# Patient Record
Sex: FEMALE | Race: OTHER | NOT HISPANIC OR LATINO | URBAN - METROPOLITAN AREA
[De-identification: names, ages, dates, MRNs, and addresses within clinical notes are randomized per-mention and may not be internally consistent; named-entity substitution may affect disease eponyms.]

---

## 2021-05-19 ENCOUNTER — EMERGENCY (EMERGENCY)
Facility: HOSPITAL | Age: 81
LOS: 1 days | Discharge: HOME | End: 2021-05-19
Attending: EMERGENCY MEDICINE
Payer: SELF-PAY

## 2021-05-19 VITALS
HEART RATE: 77 BPM | SYSTOLIC BLOOD PRESSURE: 175 MMHG | DIASTOLIC BLOOD PRESSURE: 84 MMHG | HEIGHT: 60 IN | TEMPERATURE: 98 F | RESPIRATION RATE: 16 BRPM | OXYGEN SATURATION: 98 %

## 2021-05-19 VITALS
DIASTOLIC BLOOD PRESSURE: 78 MMHG | TEMPERATURE: 98 F | RESPIRATION RATE: 16 BRPM | HEART RATE: 62 BPM | OXYGEN SATURATION: 98 % | SYSTOLIC BLOOD PRESSURE: 159 MMHG

## 2021-05-19 DIAGNOSIS — Z98.89 OTHER SPECIFIED POSTPROCEDURAL STATES: Chronic | ICD-10-CM

## 2021-05-19 PROCEDURE — 71250 CT THORAX DX C-: CPT | Mod: 26,ME

## 2021-05-19 PROCEDURE — 99284 EMERGENCY DEPT VISIT MOD MDM: CPT | Mod: 25

## 2021-05-19 PROCEDURE — 71046 X-RAY EXAM CHEST 2 VIEWS: CPT | Mod: 26

## 2021-05-19 PROCEDURE — G1004: CPT

## 2021-05-19 PROCEDURE — 93005 ELECTROCARDIOGRAM TRACING: CPT

## 2021-05-19 PROCEDURE — 99284 EMERGENCY DEPT VISIT MOD MDM: CPT

## 2021-05-19 PROCEDURE — 71046 X-RAY EXAM CHEST 2 VIEWS: CPT

## 2021-05-19 PROCEDURE — 71250 CT THORAX DX C-: CPT
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
## 2021-05-19 NOTE — ED PROVIDER NOTE - PATIENT PORTAL LINK FT
You can access the FollowMyHealth Patient Portal offered by [facility] by registering at the following website: [email or web address]. By joining [employer]’s FollowMyHealth portal, you will also be able to view your health information using other applications (apps) compatible with our system.
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
## 2021-05-19 NOTE — ED ADULT NURSE NOTE - NSIMPLEMENTINTERV_GEN_ALL_ED
Implemented All Universal Safety Interventions:  [place] to call system. Call bell, personal items and telephone within reach. Instruct patient to call for assistance. Room bathroom lighting operational. Non-slip footwear when patient is off stretcher. Physically safe environment: no spills, clutter or unnecessary equipment. Stretcher in lowest position, wheels locked, appropriate side rails in place.

## 2021-05-19 NOTE — ED PROVIDER NOTE - CHPI ED SYMPTOMS NEG
no back pain/no disorientation/no dizziness/no headache/no laceration/no neck tenderness/no decreased eating/drinking/no difficulty bearing weight

## 2021-05-19 NOTE — ED PROVIDER NOTE - NSFOLLOWUPINSTRUCTIONS_ED_ALL_ED_FT
Contusion in Adults    WHAT YOU NEED TO KNOW:    A contusion is a bruise that appears on your skin after an injury. A bruise happens when small blood vessels tear but skin does not. Blood leaks into nearby tissue, such as soft tissue or muscle.    DISCHARGE INSTRUCTIONS:    Return to the emergency department if:   •You have new trouble moving the injured area.      •You have tingling or numbness in or near the injured area.      •Your hand or foot below the bruise gets cold or turns pale.       Call your doctor if:   •You find a new lump in the injured area.      •Your symptoms do not improve with treatment after 4 to 5 days.      •You have questions or concerns about your condition or care.      Medicines: You may need any of the following:   •NSAIDs help decrease swelling and pain or fever. This medicine is available with or without a doctor's order. NSAIDs can cause stomach bleeding or kidney problems in certain people. If you take blood thinner medicine, always ask your healthcare provider if NSAIDs are safe for you. Always read the medicine label and follow directions.      •Prescription pain medicine may be given. Ask your healthcare provider how to take this medicine safely. Some prescription pain medicines contain acetaminophen. Do not take other medicines that contain acetaminophen without talking to your healthcare provider. Too much acetaminophen may cause liver damage. Prescription pain medicine may cause constipation. Ask your healthcare provider how to prevent or treat constipation.       •Take your medicine as directed. Contact your healthcare provider if you think your medicine is not helping or if you have side effects. Tell him of her if you are allergic to any medicine. Keep a list of the medicines, vitamins, and herbs you take. Include the amounts, and when and why you take them. Bring the list or the pill bottles to follow-up visits. Carry your medicine list with you in case of an emergency.      Help a contusion heal:   •Rest the injured area or use it less than usual. If you bruised your leg or foot, you may need crutches or a cane to help you walk. This will help you keep weight off your injured body part.       •Apply ice to decrease swelling and pain. Ice may also help prevent tissue damage. Use an ice pack, or put crushed ice in a plastic bag. Cover it with a towel and place it on your bruise for 15 to 20 minutes every hour or as directed.      •Use compression to support the area and decrease swelling. Wrap an elastic bandage around the area over the bruised muscle. Make sure the bandage is not too tight. You should be able to fit 1 finger between the bandage and your skin.      •Elevate (raise) your injured body part above the level of your heart to help decrease pain and swelling. Use pillows, blankets, or rolled towels to elevate the area as often as you can.      •Do not drink alcohol as directed. Alcohol may slow healing.      •Do not stretch injured muscles right after your injury. Ask your healthcare provider when and how you may safely stretch after your injury. Gentle stretches can help increase your flexibility.      •Do not massage the area or put heating pads on the bruise right after your injury. Heat and massage may slow healing. Your healthcare provider may tell you to apply heat after several days. At that time, heat will start to help the injury heal.      Prevent another contusion:   •Stretch and warm up before you play sports or exercise.      •Wear protective gear when you play sports. Examples are shin guards and padding.       •If you begin a new physical activity, start slowly to give your body a chance to adjust.      Follow up with your doctor as directed: Write down your questions so you remember to ask them during your visits
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
## 2025-06-20 ENCOUNTER — INPATIENT (INPATIENT)
Facility: HOSPITAL | Age: 85
LOS: 3 days | Discharge: HOME | DRG: 690 | End: 2025-06-24
Attending: INTERNAL MEDICINE | Admitting: INTERNAL MEDICINE
Payer: COMMERCIAL

## 2025-06-20 VITALS
SYSTOLIC BLOOD PRESSURE: 193 MMHG | TEMPERATURE: 98 F | WEIGHT: 119.71 LBS | HEART RATE: 100 BPM | DIASTOLIC BLOOD PRESSURE: 96 MMHG | RESPIRATION RATE: 16 BRPM | HEIGHT: 60 IN | OXYGEN SATURATION: 98 %

## 2025-06-20 DIAGNOSIS — D64.9 ANEMIA, UNSPECIFIED: ICD-10-CM

## 2025-06-20 DIAGNOSIS — Z98.89 OTHER SPECIFIED POSTPROCEDURAL STATES: Chronic | ICD-10-CM

## 2025-06-20 DIAGNOSIS — N12 TUBULO-INTERSTITIAL NEPHRITIS, NOT SPECIFIED AS ACUTE OR CHRONIC: ICD-10-CM

## 2025-06-20 DIAGNOSIS — E03.9 HYPOTHYROIDISM, UNSPECIFIED: ICD-10-CM

## 2025-06-20 DIAGNOSIS — E78.5 HYPERLIPIDEMIA, UNSPECIFIED: ICD-10-CM

## 2025-06-20 DIAGNOSIS — R11.2 NAUSEA WITH VOMITING, UNSPECIFIED: ICD-10-CM

## 2025-06-20 DIAGNOSIS — N83.8 OTHER NONINFLAMMATORY DISORDERS OF OVARY, FALLOPIAN TUBE AND BROAD LIGAMENT: ICD-10-CM

## 2025-06-20 DIAGNOSIS — I10 ESSENTIAL (PRIMARY) HYPERTENSION: ICD-10-CM

## 2025-06-20 DIAGNOSIS — Z29.9 ENCOUNTER FOR PROPHYLACTIC MEASURES, UNSPECIFIED: ICD-10-CM

## 2025-06-20 DIAGNOSIS — E11.9 TYPE 2 DIABETES MELLITUS WITHOUT COMPLICATIONS: ICD-10-CM

## 2025-06-20 LAB
ACETONE SERPL-MCNC: NEGATIVE — SIGNIFICANT CHANGE UP
ALBUMIN SERPL ELPH-MCNC: 3.6 G/DL — SIGNIFICANT CHANGE UP (ref 3.5–5)
ALP SERPL-CCNC: 75 U/L — SIGNIFICANT CHANGE UP (ref 40–120)
ALT FLD-CCNC: 14 U/L DA — SIGNIFICANT CHANGE UP (ref 10–60)
ANION GAP SERPL CALC-SCNC: 7 MMOL/L — SIGNIFICANT CHANGE UP (ref 5–17)
ANION GAP SERPL CALC-SCNC: 7 MMOL/L — SIGNIFICANT CHANGE UP (ref 5–17)
ANISOCYTOSIS BLD QL: SLIGHT — SIGNIFICANT CHANGE UP
APPEARANCE UR: CLEAR — SIGNIFICANT CHANGE UP
AST SERPL-CCNC: 13 U/L — SIGNIFICANT CHANGE UP (ref 10–40)
B-OH-BUTYR SERPL-SCNC: 0.1 MMOL/L — SIGNIFICANT CHANGE UP
BACTERIA # UR AUTO: NEGATIVE /HPF — SIGNIFICANT CHANGE UP
BASE EXCESS BLDV CALC-SCNC: -5.4 MMOL/L — SIGNIFICANT CHANGE UP
BASOPHILS # BLD AUTO: 0 K/UL — SIGNIFICANT CHANGE UP (ref 0–0.2)
BASOPHILS NFR BLD AUTO: 0 % — SIGNIFICANT CHANGE UP (ref 0–2)
BILIRUB SERPL-MCNC: 0.3 MG/DL — SIGNIFICANT CHANGE UP (ref 0.2–1.2)
BILIRUB UR-MCNC: NEGATIVE — SIGNIFICANT CHANGE UP
BLD GP AB SCN SERPL QL: SIGNIFICANT CHANGE UP
BUN SERPL-MCNC: 11 MG/DL — SIGNIFICANT CHANGE UP (ref 7–18)
BUN SERPL-MCNC: 15 MG/DL — SIGNIFICANT CHANGE UP (ref 7–18)
BURR CELLS BLD QL SMEAR: PRESENT — SIGNIFICANT CHANGE UP
CA-I SERPL-SCNC: SIGNIFICANT CHANGE UP MMOL/L (ref 1.15–1.33)
CALCIUM SERPL-MCNC: 8.6 MG/DL — SIGNIFICANT CHANGE UP (ref 8.4–10.5)
CALCIUM SERPL-MCNC: 9.6 MG/DL — SIGNIFICANT CHANGE UP (ref 8.4–10.5)
CHLORIDE SERPL-SCNC: 104 MMOL/L — SIGNIFICANT CHANGE UP (ref 96–108)
CHLORIDE SERPL-SCNC: 105 MMOL/L — SIGNIFICANT CHANGE UP (ref 96–108)
CO2 SERPL-SCNC: 21 MMOL/L — LOW (ref 22–31)
CO2 SERPL-SCNC: 22 MMOL/L — SIGNIFICANT CHANGE UP (ref 22–31)
COLOR SPEC: YELLOW — SIGNIFICANT CHANGE UP
CREAT SERPL-MCNC: 0.86 MG/DL — SIGNIFICANT CHANGE UP (ref 0.5–1.3)
CREAT SERPL-MCNC: 1.03 MG/DL — SIGNIFICANT CHANGE UP (ref 0.5–1.3)
DIFF PNL FLD: ABNORMAL
EGFR: 54 ML/MIN/1.73M2 — LOW
EGFR: 54 ML/MIN/1.73M2 — LOW
EGFR: 67 ML/MIN/1.73M2 — SIGNIFICANT CHANGE UP
EGFR: 67 ML/MIN/1.73M2 — SIGNIFICANT CHANGE UP
EOSINOPHIL # BLD AUTO: 0 K/UL — SIGNIFICANT CHANGE UP (ref 0–0.5)
EOSINOPHIL NFR BLD AUTO: 0 % — SIGNIFICANT CHANGE UP (ref 0–6)
EPI CELLS # UR: PRESENT
FERRITIN SERPL-MCNC: 49 NG/ML — SIGNIFICANT CHANGE UP (ref 13–330)
GAS PNL BLDV: 134 MMOL/L — LOW (ref 136–145)
GAS PNL BLDV: SIGNIFICANT CHANGE UP
GLUCOSE BLDC GLUCOMTR-MCNC: 127 MG/DL — HIGH (ref 70–99)
GLUCOSE BLDC GLUCOMTR-MCNC: 192 MG/DL — HIGH (ref 70–99)
GLUCOSE BLDV-MCNC: 239 MG/DL — HIGH (ref 70–99)
GLUCOSE SERPL-MCNC: 155 MG/DL — HIGH (ref 70–99)
GLUCOSE SERPL-MCNC: 206 MG/DL — HIGH (ref 70–99)
GLUCOSE UR QL: 100 MG/DL
HCO3 BLDV-SCNC: 20 MMOL/L — LOW (ref 22–29)
HCT VFR BLD CALC: 19.5 % — CRITICAL LOW (ref 34.5–45)
HCT VFR BLD CALC: 35.1 % — SIGNIFICANT CHANGE UP (ref 34.5–45)
HGB BLD-MCNC: 12.2 G/DL — SIGNIFICANT CHANGE UP (ref 11.5–15.5)
HGB BLD-MCNC: 6.6 G/DL — CRITICAL LOW (ref 11.5–15.5)
HYALINE CASTS # UR AUTO: PRESENT
IRON SATN MFR SERPL: 18 UG/DL — LOW (ref 40–150)
IRON SATN MFR SERPL: 6 % — LOW (ref 15–50)
KETONES UR QL: NEGATIVE MG/DL — SIGNIFICANT CHANGE UP
LACTATE BLDV-MCNC: 2.5 MMOL/L — HIGH (ref 0.5–2)
LACTATE SERPL-SCNC: 2 MMOL/L — SIGNIFICANT CHANGE UP (ref 0.7–2)
LACTATE SERPL-SCNC: 2.6 MMOL/L — HIGH (ref 0.7–2)
LEUKOCYTE ESTERASE UR-ACNC: ABNORMAL
LIDOCAIN IGE QN: 56 U/L — SIGNIFICANT CHANGE UP (ref 13–75)
LYMPHOCYTES # BLD AUTO: 0.79 K/UL — LOW (ref 1–3.3)
LYMPHOCYTES # BLD AUTO: 7 % — LOW (ref 13–44)
MAGNESIUM SERPL-MCNC: 1 MG/DL — CRITICAL LOW (ref 1.6–2.6)
MANUAL SMEAR VERIFICATION: SIGNIFICANT CHANGE UP
MCHC RBC-ENTMCNC: 29.3 PG — SIGNIFICANT CHANGE UP (ref 27–34)
MCHC RBC-ENTMCNC: 30.3 PG — SIGNIFICANT CHANGE UP (ref 27–34)
MCHC RBC-ENTMCNC: 33.8 G/DL — SIGNIFICANT CHANGE UP (ref 32–36)
MCHC RBC-ENTMCNC: 34.8 G/DL — SIGNIFICANT CHANGE UP (ref 32–36)
MCV RBC AUTO: 84.4 FL — SIGNIFICANT CHANGE UP (ref 80–100)
MCV RBC AUTO: 89.4 FL — SIGNIFICANT CHANGE UP (ref 80–100)
MONOCYTES # BLD AUTO: 0.11 K/UL — SIGNIFICANT CHANGE UP (ref 0–0.9)
MONOCYTES NFR BLD AUTO: 1 % — LOW (ref 2–14)
NEUTROPHILS # BLD AUTO: 10.41 K/UL — HIGH (ref 1.8–7.4)
NEUTROPHILS NFR BLD AUTO: 92 % — HIGH (ref 43–77)
NITRITE UR-MCNC: NEGATIVE — SIGNIFICANT CHANGE UP
NRBC # BLD: 0 /100 WBCS — SIGNIFICANT CHANGE UP (ref 0–0)
NRBC BLD AUTO-RTO: 0 /100 WBCS — SIGNIFICANT CHANGE UP (ref 0–0)
NRBC BLD-RTO: 0 /100 WBCS — SIGNIFICANT CHANGE UP (ref 0–0)
PCO2 BLDV: 38 MMHG — LOW (ref 39–42)
PH BLDV: 7.33 — SIGNIFICANT CHANGE UP (ref 7.32–7.43)
PH UR: 5.5 — SIGNIFICANT CHANGE UP (ref 5–8)
PHOSPHATE SERPL-MCNC: 2.8 MG/DL — SIGNIFICANT CHANGE UP (ref 2.5–4.5)
PLAT MORPH BLD: NORMAL — SIGNIFICANT CHANGE UP
PLATELET # BLD AUTO: 270 K/UL — SIGNIFICANT CHANGE UP (ref 150–400)
PLATELET # BLD AUTO: 471 K/UL — HIGH (ref 150–400)
PO2 BLDV: 21 MMHG — SIGNIFICANT CHANGE UP
POIKILOCYTOSIS BLD QL AUTO: SLIGHT — SIGNIFICANT CHANGE UP
POTASSIUM BLDV-SCNC: 4.2 MMOL/L — SIGNIFICANT CHANGE UP (ref 3.5–5.1)
POTASSIUM SERPL-MCNC: 3.9 MMOL/L — SIGNIFICANT CHANGE UP (ref 3.5–5.3)
POTASSIUM SERPL-MCNC: 3.9 MMOL/L — SIGNIFICANT CHANGE UP (ref 3.5–5.3)
POTASSIUM SERPL-SCNC: 3.9 MMOL/L — SIGNIFICANT CHANGE UP (ref 3.5–5.3)
POTASSIUM SERPL-SCNC: 3.9 MMOL/L — SIGNIFICANT CHANGE UP (ref 3.5–5.3)
PROT SERPL-MCNC: 7.9 G/DL — SIGNIFICANT CHANGE UP (ref 6–8.3)
PROT UR-MCNC: 100 MG/DL
RBC # BLD: 2.18 M/UL — LOW (ref 3.8–5.2)
RBC # BLD: 4.16 M/UL — SIGNIFICANT CHANGE UP (ref 3.8–5.2)
RBC # FLD: 14.4 % — SIGNIFICANT CHANGE UP (ref 10.3–14.5)
RBC # FLD: 14.8 % — HIGH (ref 10.3–14.5)
RBC BLD AUTO: ABNORMAL
RBC CASTS # UR COMP ASSIST: 0 /HPF — SIGNIFICANT CHANGE UP (ref 0–4)
SAO2 % BLDV: 30.5 % — SIGNIFICANT CHANGE UP
SODIUM SERPL-SCNC: 132 MMOL/L — LOW (ref 135–145)
SODIUM SERPL-SCNC: 134 MMOL/L — LOW (ref 135–145)
SP GR SPEC: 1.01 — SIGNIFICANT CHANGE UP (ref 1–1.03)
TIBC SERPL-MCNC: 319 UG/DL — SIGNIFICANT CHANGE UP (ref 250–450)
TRANSFERRIN SERPL-MCNC: 267 MG/DL — SIGNIFICANT CHANGE UP (ref 200–360)
TROPONIN I, HIGH SENSITIVITY RESULT: 1648.7 NG/L — HIGH
TROPONIN I, HIGH SENSITIVITY RESULT: 202.8 NG/L — HIGH
UIBC SERPL-MCNC: 301 UG/DL — SIGNIFICANT CHANGE UP (ref 110–370)
UROBILINOGEN FLD QL: 0.2 MG/DL — SIGNIFICANT CHANGE UP (ref 0.2–1)
WBC # BLD: 11.32 K/UL — HIGH (ref 3.8–10.5)
WBC # BLD: 21.52 K/UL — HIGH (ref 3.8–10.5)
WBC # FLD AUTO: 11.32 K/UL — HIGH (ref 3.8–10.5)
WBC # FLD AUTO: 21.52 K/UL — HIGH (ref 3.8–10.5)
WBC UR QL: 5 /HPF — SIGNIFICANT CHANGE UP (ref 0–5)

## 2025-06-20 PROCEDURE — 99291 CRITICAL CARE FIRST HOUR: CPT

## 2025-06-20 PROCEDURE — 71260 CT THORAX DX C+: CPT | Mod: 26

## 2025-06-20 PROCEDURE — 74174 CTA ABD&PLVS W/CONTRAST: CPT | Mod: 26

## 2025-06-20 RX ORDER — CEFTRIAXONE 500 MG/1
2000 INJECTION, POWDER, FOR SOLUTION INTRAMUSCULAR; INTRAVENOUS EVERY 24 HOURS
Refills: 0 | Status: DISCONTINUED | OUTPATIENT
Start: 2025-06-21 | End: 2025-06-24

## 2025-06-20 RX ORDER — OXYCODONE HYDROCHLORIDE 30 MG/1
5 TABLET ORAL EVERY 4 HOURS
Refills: 0 | Status: DISCONTINUED | OUTPATIENT
Start: 2025-06-20 | End: 2025-06-24

## 2025-06-20 RX ORDER — ALENDRONATE SODIUM 70 MG/1
1 TABLET ORAL
Refills: 0 | DISCHARGE

## 2025-06-20 RX ORDER — INSULIN LISPRO 100 U/ML
INJECTION, SOLUTION INTRAVENOUS; SUBCUTANEOUS
Refills: 0 | Status: DISCONTINUED | OUTPATIENT
Start: 2025-06-20 | End: 2025-06-24

## 2025-06-20 RX ORDER — AMLODIPINE BESYLATE 10 MG/1
5 TABLET ORAL DAILY
Refills: 0 | Status: DISCONTINUED | OUTPATIENT
Start: 2025-06-20 | End: 2025-06-24

## 2025-06-20 RX ORDER — SODIUM CHLORIDE 9 G/1000ML
1000 INJECTION, SOLUTION INTRAVENOUS
Refills: 0 | Status: DISCONTINUED | OUTPATIENT
Start: 2025-06-20 | End: 2025-06-21

## 2025-06-20 RX ORDER — BISACODYL 5 MG
5 TABLET, DELAYED RELEASE (ENTERIC COATED) ORAL DAILY
Refills: 0 | Status: DISCONTINUED | OUTPATIENT
Start: 2025-06-20 | End: 2025-06-24

## 2025-06-20 RX ORDER — NALOXONE HYDROCHLORIDE 0.4 MG/ML
0.4 INJECTION, SOLUTION INTRAMUSCULAR; INTRAVENOUS; SUBCUTANEOUS ONCE
Refills: 0 | Status: DISCONTINUED | OUTPATIENT
Start: 2025-06-20 | End: 2025-06-24

## 2025-06-20 RX ORDER — AMLODIPINE BESYLATE 10 MG/1
1 TABLET ORAL
Refills: 0 | DISCHARGE

## 2025-06-20 RX ORDER — MAGNESIUM, ALUMINUM HYDROXIDE 200-200 MG
30 TABLET,CHEWABLE ORAL EVERY 4 HOURS
Refills: 0 | Status: DISCONTINUED | OUTPATIENT
Start: 2025-06-20 | End: 2025-06-24

## 2025-06-20 RX ORDER — OXYCODONE HYDROCHLORIDE 30 MG/1
2.5 TABLET ORAL EVERY 4 HOURS
Refills: 0 | Status: DISCONTINUED | OUTPATIENT
Start: 2025-06-20 | End: 2025-06-24

## 2025-06-20 RX ORDER — LOSARTAN POTASSIUM 100 MG/1
100 TABLET, FILM COATED ORAL DAILY
Refills: 0 | Status: DISCONTINUED | OUTPATIENT
Start: 2025-06-20 | End: 2025-06-24

## 2025-06-20 RX ORDER — LEVOTHYROXINE SODIUM 300 MCG
1 TABLET ORAL
Refills: 0 | DISCHARGE

## 2025-06-20 RX ORDER — SENNA 187 MG
2 TABLET ORAL AT BEDTIME
Refills: 0 | Status: DISCONTINUED | OUTPATIENT
Start: 2025-06-20 | End: 2025-06-24

## 2025-06-20 RX ORDER — ONDANSETRON HCL/PF 4 MG/2 ML
4 VIAL (ML) INJECTION ONCE
Refills: 0 | Status: COMPLETED | OUTPATIENT
Start: 2025-06-20 | End: 2025-06-20

## 2025-06-20 RX ORDER — SITAGLIPTIN AND METFORMIN HYDROCHLORIDE 1000; 50 MG/1; MG/1
1 TABLET, FILM COATED ORAL
Refills: 0 | DISCHARGE

## 2025-06-20 RX ORDER — CEFTRIAXONE 500 MG/1
1000 INJECTION, POWDER, FOR SOLUTION INTRAMUSCULAR; INTRAVENOUS ONCE
Refills: 0 | Status: COMPLETED | OUTPATIENT
Start: 2025-06-20 | End: 2025-06-20

## 2025-06-20 RX ORDER — POLYETHYLENE GLYCOL 3350 17 G/17G
17 POWDER, FOR SOLUTION ORAL DAILY
Refills: 0 | Status: DISCONTINUED | OUTPATIENT
Start: 2025-06-20 | End: 2025-06-24

## 2025-06-20 RX ORDER — MAGNESIUM SULFATE 500 MG/ML
1 SYRINGE (ML) INJECTION ONCE
Refills: 0 | Status: COMPLETED | OUTPATIENT
Start: 2025-06-20 | End: 2025-06-20

## 2025-06-20 RX ORDER — GEMFIBROZIL 600 MG/1
1 TABLET, FILM COATED ORAL
Refills: 0 | DISCHARGE

## 2025-06-20 RX ORDER — INSULIN LISPRO 100 U/ML
INJECTION, SOLUTION INTRAVENOUS; SUBCUTANEOUS AT BEDTIME
Refills: 0 | Status: DISCONTINUED | OUTPATIENT
Start: 2025-06-20 | End: 2025-06-24

## 2025-06-20 RX ORDER — LIDOCAINE HYDROCHLORIDE 20 MG/ML
1 JELLY TOPICAL DAILY
Refills: 0 | Status: DISCONTINUED | OUTPATIENT
Start: 2025-06-20 | End: 2025-06-24

## 2025-06-20 RX ORDER — MELATONIN 5 MG
3 TABLET ORAL AT BEDTIME
Refills: 0 | Status: DISCONTINUED | OUTPATIENT
Start: 2025-06-20 | End: 2025-06-24

## 2025-06-20 RX ORDER — ONDANSETRON HCL/PF 4 MG/2 ML
4 VIAL (ML) INJECTION EVERY 8 HOURS
Refills: 0 | Status: DISCONTINUED | OUTPATIENT
Start: 2025-06-20 | End: 2025-06-24

## 2025-06-20 RX ORDER — LOSARTAN POTASSIUM 100 MG/1
1 TABLET, FILM COATED ORAL
Refills: 0 | DISCHARGE

## 2025-06-20 RX ORDER — SODIUM CHLORIDE 9 G/1000ML
1000 INJECTION, SOLUTION INTRAVENOUS ONCE
Refills: 0 | Status: COMPLETED | OUTPATIENT
Start: 2025-06-20 | End: 2025-06-20

## 2025-06-20 RX ORDER — LEVOTHYROXINE SODIUM 300 MCG
25 TABLET ORAL DAILY
Refills: 0 | Status: DISCONTINUED | OUTPATIENT
Start: 2025-06-20 | End: 2025-06-24

## 2025-06-20 RX ADMIN — Medication 2 TABLET(S): at 21:42

## 2025-06-20 RX ADMIN — Medication 4 MILLIGRAM(S): at 09:51

## 2025-06-20 RX ADMIN — OXYCODONE HYDROCHLORIDE 5 MILLIGRAM(S): 30 TABLET ORAL at 19:29

## 2025-06-20 RX ADMIN — Medication 100 GRAM(S): at 22:55

## 2025-06-20 RX ADMIN — LIDOCAINE HYDROCHLORIDE 1 PATCH: 20 JELLY TOPICAL at 20:54

## 2025-06-20 RX ADMIN — OXYCODONE HYDROCHLORIDE 2.5 MILLIGRAM(S): 30 TABLET ORAL at 14:55

## 2025-06-20 RX ADMIN — OXYCODONE HYDROCHLORIDE 5 MILLIGRAM(S): 30 TABLET ORAL at 18:45

## 2025-06-20 RX ADMIN — SODIUM CHLORIDE 75 MILLILITER(S): 9 INJECTION, SOLUTION INTRAVENOUS at 22:53

## 2025-06-20 RX ADMIN — Medication 20 MILLIGRAM(S): at 09:17

## 2025-06-20 RX ADMIN — AMLODIPINE BESYLATE 5 MILLIGRAM(S): 10 TABLET ORAL at 15:25

## 2025-06-20 RX ADMIN — OXYCODONE HYDROCHLORIDE 2.5 MILLIGRAM(S): 30 TABLET ORAL at 15:25

## 2025-06-20 RX ADMIN — SODIUM CHLORIDE 1000 MILLILITER(S): 9 INJECTION, SOLUTION INTRAVENOUS at 14:57

## 2025-06-20 RX ADMIN — CEFTRIAXONE 100 MILLIGRAM(S): 500 INJECTION, POWDER, FOR SOLUTION INTRAMUSCULAR; INTRAVENOUS at 13:40

## 2025-06-20 RX ADMIN — Medication 1000 MILLILITER(S): at 09:17

## 2025-06-20 RX ADMIN — LIDOCAINE HYDROCHLORIDE 1 PATCH: 20 JELLY TOPICAL at 18:45

## 2025-06-20 RX ADMIN — SODIUM CHLORIDE 75 MILLILITER(S): 9 INJECTION, SOLUTION INTRAVENOUS at 15:25

## 2025-06-20 RX ADMIN — INSULIN LISPRO 1: 100 INJECTION, SOLUTION INTRAVENOUS; SUBCUTANEOUS at 18:43

## 2025-06-20 NOTE — PATIENT PROFILE ADULT - FALL HARM RISK - HARM RISK INTERVENTIONS

## 2025-06-20 NOTE — H&P ADULT - NSHPPHYSICALEXAM_GEN_ALL_CORE
Gen: AOx3, NAD, non-toxic, pleasant  HEAD:  Atraumatic, Normocephalic  EYES: PERRLA, conjunctiva and sclera clear  ENT: Moist mucous membranes  NECK: Supple, No JVD  CV: S1+S2 normal, no murmurs   Resp: Clear bilat, no resp distress, no crackles/wheezes  Abd: Soft, nontender, +BS  Ext: No LE edema, no cyanosis, LE pulses present  IV/Skin: No skin rash  Msk: No joint swelling  Neuro: AAOx3. No focal deficits  Psych: no anxiety, no delusional ideas, no suicidal ideation Gen: AOx3, NAD, non-toxic, pleasant  HEAD:  Atraumatic, Normocephalic  EYES: PERRLA, conjunctiva and sclera clear  ENT: dry mucous membranes  NECK: Supple, No JVD  CV: +tachcyardic  Resp: Clear bilat, no resp distress, no crackles/wheezes  Abd: +lower abd tenderness bilateral; bl flank tenderness  Ext: No LE edema, no cyanosis, LE pulses present  IV/Skin: No skin rash  Msk: No joint swelling  Neuro: AAOx3. No focal deficits  Psych: no anxiety, no delusional ideas, no suicidal ideation

## 2025-06-20 NOTE — H&P ADULT - NSHPREVIEWOFSYSTEMS_GEN_ALL_CORE
CONSTITUTIONAL:  No fevers or chills, good appetite, good general state  EYES/ENT:  No visual changes;  No vertigo or throat pain   NECK:  No neck pain or stiffness  RESPIRATORY:  No cough, wheezing, hemoptysis; No shortness of breath  CARDIOVASCULAR:  No chest pain or palpitations  GASTROINTESTINAL:  No abdominal pain. No nausea, vomiting, or hematemesis; No diarrhea or constipation. No melena or hematochezia.  GENITOURINARY:  No dysuria, frequency or hematuria  NEUROLOGICAL:  No HA, no numbness or LE weakness  MSK: no back pain, no joint pain  SKIN:  No itching, no skin rash CONSTITUTIONAL:  +decreased PO in the last few days, +weight loss   EYES/ENT:  No visual changes;  No vertigo or throat pain   NECK:  No neck pain or stiffness  RESPIRATORY:  No cough, wheezing, hemoptysis; No shortness of breath  CARDIOVASCULAR:  No chest pain or palpitations  GASTROINTESTINAL:  +n/v/d, lower abd pain   GENITOURINARY:  +bl flank pain   NEUROLOGICAL:  No HA, no numbness or LE weakness  MSK: no back pain, no joint pain  SKIN:  No itching, no skin rash

## 2025-06-20 NOTE — H&P ADULT - ASSESSMENT
85F from home with past medical history NIDDM, HTN, HLD, hypothyroidism past surgical history of appendectomy and hysterectomy presenting with 3 weeks of nausea vomiting diarrhea and abdominal pain radiating to the back.  Patient states she has had approximately 3 weeks of nonbloody diarrhea and approximately 1 week of intermittent nonbilious nonbloody vomiting.  Patient has also had lower abdominal pain radiates to the back.  Denies any other past medical history, allergies, surgeries, daily alcohol, smoking, IV drug use.    ED Course    Vitals: BPmax 193/96  Labs: glu 239; lact 2.6>2.5; vbc co2 38; bicarb 20; trop 202; wbc 11; hb 6.6  Intervention: 1uprbc; zofran, pepcid, ctx   Consults: gyn  Images:    CTAP     IMPRESSION:  1. Moderate perivesicular stranding, compatible with cystitis. Mild bilateral pyelonephritis.  2. Prominence of theright adnexa which may represent an ovarian mass. Correlate with pelvic ultrasound.       85F from home with past medical history NIDDM, HTN, HLD, hypothyroidism past surgical history of appendectomy and hysterectomy presenting with 3 weeks of nausea vomiting diarrhea and abdominal pain radiating to the back.  Patient states she has had approximately 3 weeks of nonbloody diarrhea and approximately 1 week of intermittent nonbilious nonbloody vomiting.  Patient has also had lower abdominal pain radiates to the back.  Denies any other past medical history, allergies, surgeries, daily alcohol, smoking, IV drug use.    ED Course    Vitals: BPmax 193/96  Labs: glu 239; lact 2.6>2.5; vbc co2 38; bicarb 20; trop 202; wbc 11; hb 6.6  Intervention: 1uprbc; zofran, pepcid, ctx   Consults: gyn  Images:    CTAP          85F from home, pmh NIDDM, HTN, HLD, hypothyroidism, past surgical history of appendectomy and hysterectomy, presenting with 3 weeks of nonbloody diarrhea and vomiting all night prior to admission. She reports okay PO intake until two days ago and has lost 15 lbs in about 5 weeks. On exam she appears well but complains of severe lower abdominal pain and bilateral flank pain. Imaging suggests bilateral pyelonephritis and new R adnexal mass. She will be admitted for anemia requiring transfusion, antibiotic management of pyelo, and gynecologic evaluation.

## 2025-06-20 NOTE — CONSULT NOTE ADULT - SUBJECTIVE AND OBJECTIVE BOX
84yo p3 admitted for pyelonephritis     by CT scan - incidental finding of ov mass    pmh: NIDDM/ HTN/ HLD/ Hypothyroid    hx of appendicitis/ hx of hysterectomy-     per pt she had hysterectomy after age 42 due to uterine prolapse; pt is not sure whether the ovaries were removed during that time of surgery; pt states she f/u gyn, but she forgot her gyn md name    pt states no recent pap smear    last mammo age 72 - all mammo has been all normal per pt    gyn hx:    menarche: age 15; menopause age 42; deneis any hx of post menopausal bleed    hysterectomy due to hx of prolapse    ob hx:     p3 -  x3    Vital Signs Last 24 Hrs  T(C): 36.8 (2025 11:00), Max: 36.8 (2025 11:00)  T(F): 98.2 (2025 11:00), Max: 98.2 (2025 11:00)  HR: 95 (2025 11:00) (95 - 100)  BP: 162/94 (2025 11:00) (162/94 - 193/96)  BP(mean): --  RR: 17 (2025 11:00) (16 - 17)  SpO2: 97% (2025 11:00) (97% - 98%)    Parameters below as of 2025 11:00  Patient On (Oxygen Delivery Method): room air    pt seen in er spot 33    pt alert not in acute distress    skin warm dry good color    abd soft NT no guardind no rebound no organomegally    pelvic - internal exam deferred    extrem no edema b/l                           6.6    11.32 )-----------( 270      ( 2025 08:53 )             19.5     06-20    134[L]  |  105  |  15  ----------------------------<  206[H]  3.9   |  22  |  1.03    Ca    9.6      2025 08:53    TPro  7.9  /  Alb  3.6  /  TBili  0.3  /  DBili  x   /  AST  13  /  ALT  14  /  AlkPhos  75  06-20    < from: CT Angio Abdomen and Pelvis w/ IV Cont (25 @ 11:58) >  INTERPRETATION:  CLINICAL INFORMATION: Nausea, vomiting, diarrhea, anemia.    COMPARISON: Noncontrast CT of the thorax May 19, 2021.    CONTRAST/COMPLICATIONS:  IV Contrast: Omnipaque 350  90 cc administered   10 cc discarded  Oral Contrast: NONE.    PROCEDURE:  CT of the Chest, Abdomen and Pelvis was performed.  Precontrast and arterial phase postcontrast imaging of the abdomen and   pelvis was performed.  Sagittal and coronal reformats were performed.    FINDINGS:  CHEST:  LUNGS AND LARGE AIRWAYS: Patent central airways. Trace scattered cystic   changes. No pulmonary nodules.  PLEURA: No pleural effusion. No pneumothorax or pneumomediastinum.  VESSELS: Within normal limits.  HEART: Heart size is normal. No pericardial effusion.  MEDIASTINUM AND TASIA: No lymphadenopathy.  CHEST WALL AND LOWER NECK: Within normal limits.    ABDOMEN AND PELVIS:  LIVER: Within normal limits.  BILE DUCTS: Normal caliber.  GALLBLADDER: Within normal limits.  SPLEEN: Within normal limits.  PANCREAS: Within normal limits. Variant pancreas ductus divisum. The   pancreatic duct is notdilated.  ADRENALS: Within normal limits.  KIDNEYS/URETERS: Small bilateral renal cysts. Trace bilateral renal   cortical scarring. Wedge-shaped region of decreased enhancement in the   lateral mid to upper pole of the right kidney and the lateral midpole of   the left kidney, suggestive of bilateral pyelonephritis. No renal   abscess. No hydronephrosis, stone, mass, or perinephric stranding   bilaterally.    No ureteral or bladder stones.    BLADDER: The bladder is smooth in contour. There is moderate   perivesicular stranding extending to the pelvic sidewalls.  REPRODUCTIVE ORGANS: Uterus has been removed. There is a 3.1 x 2.6 cm   right adnexal lesion. The left ovary measures 1.4 x 1.1 cm.    BOWEL: Scattered sigmoid diverticula. No bowel obstruction. No mesenteric   adenopathy. There is no evidence of active intraluminal extravasation of   contrast in the bowel.  PERITONEUM/RETROPERITONEUM: Within normal limits.  VESSELS: Within normal limits.  LYMPH NODES: No lymphadenopathy.  ABDOMINAL WALL: Within normal limits.  BONES: Chronic moderate anterior wedging deformity of L1. Healed sternal   fracture.    IMPRESSION:  1. Moderate perivesicular stranding, compatible with cystitis. Mild   bilateral pyelonephritis.  2. Prominence of theright adnexa which may represent an ovarian mass.   Correlate with pelvic ultrasound.

## 2025-06-20 NOTE — H&P ADULT - PROBLEM SELECTOR PLAN 4
takes  f/u A1c  ISS 15lbs weight loss in about a month   s/p hysterectomy     CTAP: Prominence of the right adnexa which may represent an ovarian mass. Correlate with pelvic ultrasound.    pain control: 2.5, 5 oxy for moderate/severe pain + lidocaine + hot/cold packs   obgyn consulted

## 2025-06-20 NOTE — ED PROVIDER NOTE - OBJECTIVE STATEMENT
Patient is a 85-year-old female with past medical history NIDDM, HTN, HLD, hypothyroidism past surgical history of appendectomy and hysterectomy presenting with 3 weeks of nausea vomiting diarrhea and abdominal pain radiating to the back.  Patient states she has had approximately 3 weeks of nonbloody diarrhea and approximately 1 week of intermittent nonbilious nonbloody vomiting.  Patient has also had lower abdominal pain radiates to the back.  Denies any other past medical history, allergies, surgeries, daily alcohol, smoking, IV drug use.

## 2025-06-20 NOTE — H&P ADULT - HISTORY OF PRESENT ILLNESS
Patient is a 85-year-old female with past medical history NIDDM, HTN, HLD, hypothyroidism past surgical history of appendectomy and hysterectomy presenting with 3 weeks of nausea vomiting diarrhea and abdominal pain radiating to the back.  Patient states she has had approximately 3 weeks of nonbloody diarrhea and approximately 1 week of intermittent nonbilious nonbloody vomiting.  Patient has also had lower abdominal pain radiates to the back.  Denies any other past medical history, allergies, surgeries, daily alcohol, smoking, IV drug use.    ED Course    Vitals: BPmax 193/96  Labs:   Intervention:   Consults:  Images:       Patient is a 85-year-old female with past medical history NIDDM, HTN, HLD, hypothyroidism past surgical history of appendectomy and hysterectomy presenting with 3 weeks of nausea vomiting diarrhea and abdominal pain radiating to the back.  Patient states she has had approximately 3 weeks of nonbloody diarrhea and approximately 1 week of intermittent nonbilious nonbloody vomiting.  Patient has also had lower abdominal pain radiates to the back.  Denies any other past medical history, allergies, surgeries, daily alcohol, smoking, IV drug use.    ED Course    Vitals: BPmax 193/96  Labs: glu 239; lact 2.6>2.5; vbc co2 38;   Intervention:   Consults:  Images:   85F from home with past medical history NIDDM, HTN, HLD, hypothyroidism past surgical history of appendectomy and hysterectomy presenting with 3 weeks of nausea vomiting diarrhea and abdominal pain radiating to the back.  Patient states she has had approximately 3 weeks of nonbloody diarrhea and approximately 1 week of intermittent nonbilious nonbloody vomiting.  Patient has also had lower abdominal pain radiates to the back.  Denies any other past medical history, allergies, surgeries, daily alcohol, smoking, IV drug use.    ED Course    Vitals: BPmax 193/96  Labs: glu 239; lact 2.6>2.5; vbc co2 38; bicarb 20; trop 202; wbc 11; hb 6.6  Intervention: 1uprbc; zofran, pepcid, ctx   Consults: gyn  Images:    CTAP     IMPRESSION:  1. Moderate perivesicular stranding, compatible with cystitis. Mild bilateral pyelonephritis.  2. Prominence of theright adnexa which may represent an ovarian mass. Correlate with pelvic ultrasound.     85F from home, pmh NIDDM, HTN, HLD, hypothyroidism, past surgical history of appendectomy and hysterectomy, presenting with 3 weeks of nonbloody diarrhea and vomiting all night prior to admission. She reports okay PO intake until two days ago and has lost 15 lbs in about 5 weeks. On exam she appears well but complains of severe lower abdominal pain and bilateral flank pain. Imaging suggests bilateral pyelonephritis and new R adnexal mass. She will be admitted for anemia requiring transfusion, antibiotic management of pyelo, and gynecologic evaluation.     Note: daughter Tyler ([phone]) and son Rancho ([phone]) are both MDs. Spoke with Rancho on the phone and he confirmed he will be here later today. Confirmed patient is currently FULL CODE.     ED Course    Vitals: BPmax 193/96  Labs: glu 239; lact 2.6>2.5; vbc co2 38; bicarb 20; trop 202; wbc 11; hb 6.6  Intervention: 1uprbc; zofran, pepcid, ctx   Consults: gyn  Images:    CTAP     IMPRESSION:  1. Moderate perivesicular stranding, compatible with cystitis. Mild bilateral pyelonephritis.  2. Prominence of the right adnexa which may represent an ovarian mass. Correlate with pelvic ultrasound.     85F from home, pmh NIDDM, HTN, HLD, hypothyroidism, past surgical history of appendectomy and hysterectomy, presenting with 3 weeks of nonbloody diarrhea and vomiting all night prior to admission. She reports okay PO intake until two days ago and has lost 15 lbs in about 5 weeks. On exam she appears well but complains of severe lower abdominal pain and bilateral flank pain. Imaging suggests bilateral pyelonephritis and new R adnexal mass. She will be admitted for anemia requiring transfusion, antibiotic management of pyelo, and gynecologic evaluation.     Note: daughter Tyler ([phone]) and son Rancho ([phone]) are both MDs. Spoke with Rancho on the phone and he confirmed he will be here later today. Confirmed patient is currently FULL CODE.     ED Course    Vitals: BPmax 193/96  Labs: glu 239; lact 2.6>2.5; vbc co2 38; bicarb 20; trop 202; wbc 11; hb 6.6; UA neg   Intervention: 1uprbc; zofran, pepcid, ctx   Consults: gyn  Images:    CTAP     IMPRESSION:  1. Moderate perivesicular stranding, compatible with cystitis. Mild bilateral pyelonephritis.  2. Prominence of the right adnexa which may represent an ovarian mass. Correlate with pelvic ultrasound.

## 2025-06-20 NOTE — ED PROVIDER NOTE - CLINICAL SUMMARY MEDICAL DECISION MAKING FREE TEXT BOX
Patient is a 85-year-old female with past medical history NIDDM, HTN, HLD, hypothyroidism past surgical history of appendectomy and hysterectomy presenting with 3 weeks of nausea vomiting diarrhea and abdominal pain radiating to the back. blood pressure 193/96, vitals stable otherwise, belly soft no rebound no guarding no peritoneal signs.  – Will hydrate, antiemetics, check labs rule out electrolyte abnormalities and pancreatitis, lactate eval for end organ dysfunction, attempt to send GI PCR, CT A/P to rule out colitis versus diverticulitis versus obstruction, reassess.

## 2025-06-20 NOTE — H&P ADULT - PROBLEM SELECTOR PLAN 2
s/p zofran in ER   zofran prn   100cc/hr maintenance fluids hb 6.6 on admission > 1uprbc  tachycardic to 100 >1L IVF  likely ISO malnutrition for several weeks + possible malignancy     f/u tibc, ferritin, transferrin (obtained prior to blood infusion)   75cc/hr maintenance fluids   maintain active T&S  trend cbc, transfuse prn    imaging as above. hb 6.6 on admission > 1uprbc  tachycardic to 100 >1L IVF  likely ISO malnutrition for several weeks + possible malignancy     f/u tibc, ferritin, transferrin (obtained prior to blood transfusion)   75cc/hr maintenance fluids   maintain active T&S  trend cbc, transfuse prn    imaging as above.

## 2025-06-20 NOTE — CONSULT NOTE ADULT - ASSESSMENT
84yo p3 admitted for pyelonephritis     by CT scan - incidental finding of ov mass    pmh: NIDDM/ HTN/ HLD/ Hypothyroid    hx of appendicitis/ hx of hysterectomy-     - dr sanon reviewed the CT scan: recommends Transvaginal US- pt is notified of the recommended study to assess the right adnexal mass may represent ov mass     - pt verbalized understanding - aware of the Transvaginal US for better assessment of the pelvis vs abdominal US

## 2025-06-20 NOTE — H&P ADULT - PROBLEM SELECTOR PLAN 1
hb 6.6 on admission > 1uprbc  likely ISO malnutrition for several weeks + possible malignancy     f/u tibc, ferritin, transferrin  maintain active T&S  trend cbc, transfuse prn bilateral flank pain   nonseptic on admission   s/p ceftriaxone, 1L IVF in ER     c/w ceftriaxone pyelo dosing   75cc/hr maintenance   f/u ucx 6/20    CTAP: Moderate perivesicular stranding, compatible with cystitis. Mild bilateral pyelonephritis. bilateral flank pain though UA negative   nonseptic on admission   s/p ceftriaxone, 1L IVF in ER     c/w ceftriaxone pyelo dosing   75cc/hr maintenance     CTAP: Moderate perivesicular stranding, compatible with cystitis. Mild bilateral pyelonephritis.

## 2025-06-20 NOTE — ED ADULT NURSE NOTE - HISTORY OF COVID-19 VACCINATION
MALE TIMOTHY;      GA 37.5 weeks;     Age: 4 d;   PMA: _____      Current Status: FT DANIEL - withdrawal, DiGeorge syndrome, club feet    Weight: 2528 grams  (-83)   Intake(ml/kg/day):   90  Urine output:   X8                      Stools (frequency): X0    Other:     *******************************************************  FEN: Feed SA PO ad angelina q3 hours.    DiGeorge Syndrome: 22 q11 from amniocentesis.   Respiratory: Comfortable in RA.  VSD: Fetal echo. Asymptomatic   immunology: Full T-cell subset and lymphocyte mitogen assay completed (See Lab results).  Reverse isolation. NO EHM.   Heme: Thrombocytopenia - resolved  Physiologic jaundice. Monitoring Bilirubin.  Club Feet.  Orthopedic follow-up after discharge  Neuro: Normal exam for GA. HC: 32 cm  Narcotic abstinence (Maternal methadone): Last DANIEL 8. On morphine 0.055 mg/kg/dose p.o. q 3 hrs. - urine not received - late to send now/[facility] tox pending   Crib  Genetics - Dr. Leal following - requested Renal U/S, Head U/S, Ophthalmology consult (r/o coloboma) and ENT consult (r/o cleft)  Social: Mother UTox - cannabinoids and methadone    Plan: Review Lab studies with Immunology.  Feed ad angelina. Monitor DANIEL scores and adjust morphine dose accordingly.  Developmental assessment and long-term follow-up.      Labs/Imaging/Studies: No

## 2025-06-20 NOTE — ED PROVIDER NOTE - PROGRESS NOTE DETAILS
Labs significant for blood loss anemia.  Consented, PRBC given.  Imaging showing pyelonephritis, right adnexal mass.  IV a bx given, No active bleeding.  Vital signs stable resting comfortably.  Symptoms improving.  Will admit to medicine for further management and evaluation. Leandro Eaton MD.

## 2025-06-20 NOTE — H&P ADULT - PROBLEM SELECTOR PLAN 3
CTAP: IMPRESSION:  1. Moderate perivesicular stranding, compatible with cystitis. Mild bilateral pyelonephritis.  2. Prominence of the right adnexa which may represent an ovarian mass. Correlate with pelvic ultrasound.    obgyn consulted, will see likely ISO pyelonephritis   s/p zofran in ER   zofran prn   75 cc/hr maintenance fluids

## 2025-06-20 NOTE — H&P ADULT - TIME BILLING
- Review of records, telemetry, vital signs and daily labs.   - General and cardiovascular physical examination.  - Generation of cardiovascular treatment plan and completion of note .  - Coordination of care-discussed with ACP, and  on disposition plan .      Patient was seen and examined by me on 6/20/25 ,interim events noted,labs and radiology studies reviewed.  Sunny Palacios MD,FACC.  [address] [place], [place] [id number].  [phone]  Availabe to call or text on [employer] TE[employer].

## 2025-06-21 ENCOUNTER — RESULT REVIEW (OUTPATIENT)
Age: 85
End: 2025-06-21

## 2025-06-21 LAB
A1C WITH ESTIMATED AVERAGE GLUCOSE RESULT: 6.5 % — HIGH (ref 4–5.6)
AFP-TM SERPL-MCNC: <1.8 NG/ML — SIGNIFICANT CHANGE UP
ALBUMIN SERPL ELPH-MCNC: 2.6 G/DL — LOW (ref 3.5–5)
ALP SERPL-CCNC: 55 U/L — SIGNIFICANT CHANGE UP (ref 40–120)
ALT FLD-CCNC: 12 U/L DA — SIGNIFICANT CHANGE UP (ref 10–60)
ANION GAP SERPL CALC-SCNC: 5 MMOL/L — SIGNIFICANT CHANGE UP (ref 5–17)
APPEARANCE UR: CLEAR — SIGNIFICANT CHANGE UP
AST SERPL-CCNC: 18 U/L — SIGNIFICANT CHANGE UP (ref 10–40)
BASOPHILS # BLD AUTO: 0.03 K/UL — SIGNIFICANT CHANGE UP (ref 0–0.2)
BASOPHILS NFR BLD AUTO: 0.2 % — SIGNIFICANT CHANGE UP (ref 0–2)
BILIRUB SERPL-MCNC: 0.2 MG/DL — SIGNIFICANT CHANGE UP (ref 0.2–1.2)
BILIRUB UR-MCNC: NEGATIVE — SIGNIFICANT CHANGE UP
BUN SERPL-MCNC: 10 MG/DL — SIGNIFICANT CHANGE UP (ref 7–18)
CALCIUM SERPL-MCNC: 8 MG/DL — LOW (ref 8.4–10.5)
CANCER AG125 SERPL-ACNC: 14 U/ML — SIGNIFICANT CHANGE UP
CEA SERPL-MCNC: 2.2 NG/ML — SIGNIFICANT CHANGE UP (ref 0–3.8)
CHLORIDE SERPL-SCNC: 103 MMOL/L — SIGNIFICANT CHANGE UP (ref 96–108)
CHOLEST SERPL-MCNC: 133 MG/DL — SIGNIFICANT CHANGE UP
CK MB BLD-MCNC: 6.1 % — HIGH (ref 0–3.5)
CK MB CFR SERPL CALC: 8 NG/ML — HIGH (ref 0–3.6)
CK SERPL-CCNC: 131 U/L — SIGNIFICANT CHANGE UP (ref 21–215)
CO2 SERPL-SCNC: 22 MMOL/L — SIGNIFICANT CHANGE UP (ref 22–31)
COLOR SPEC: YELLOW — SIGNIFICANT CHANGE UP
CREAT ?TM UR-MCNC: <13 MG/DL — SIGNIFICANT CHANGE UP
CREAT SERPL-MCNC: 0.73 MG/DL — SIGNIFICANT CHANGE UP (ref 0.5–1.3)
DIFF PNL FLD: NEGATIVE — SIGNIFICANT CHANGE UP
EGFR: 82 ML/MIN/1.73M2 — SIGNIFICANT CHANGE UP
EGFR: 82 ML/MIN/1.73M2 — SIGNIFICANT CHANGE UP
EOSINOPHIL # BLD AUTO: 0.05 K/UL — SIGNIFICANT CHANGE UP (ref 0–0.5)
EOSINOPHIL NFR BLD AUTO: 0.3 % — SIGNIFICANT CHANGE UP (ref 0–6)
ESTIMATED AVERAGE GLUCOSE: 140 MG/DL — HIGH (ref 68–114)
GLUCOSE BLDC GLUCOMTR-MCNC: 112 MG/DL — HIGH (ref 70–99)
GLUCOSE BLDC GLUCOMTR-MCNC: 153 MG/DL — HIGH (ref 70–99)
GLUCOSE BLDC GLUCOMTR-MCNC: 161 MG/DL — HIGH (ref 70–99)
GLUCOSE BLDC GLUCOMTR-MCNC: 237 MG/DL — HIGH (ref 70–99)
GLUCOSE SERPL-MCNC: 146 MG/DL — HIGH (ref 70–99)
GLUCOSE UR QL: NEGATIVE MG/DL — SIGNIFICANT CHANGE UP
HAPTOGLOB SERPL-MCNC: 255 MG/DL — HIGH (ref 34–200)
HCT VFR BLD CALC: 31.3 % — LOW (ref 34.5–45)
HDLC SERPL-MCNC: 34 MG/DL — LOW
HGB BLD-MCNC: 10.9 G/DL — LOW (ref 11.5–15.5)
IMM GRANULOCYTES NFR BLD AUTO: 0.4 % — SIGNIFICANT CHANGE UP (ref 0–0.9)
KETONES UR QL: NEGATIVE MG/DL — SIGNIFICANT CHANGE UP
LACTATE SERPL-SCNC: 1.1 MMOL/L — SIGNIFICANT CHANGE UP (ref 0.7–2)
LDLC SERPL-MCNC: 76 MG/DL — SIGNIFICANT CHANGE UP
LEUKOCYTE ESTERASE UR-ACNC: NEGATIVE — SIGNIFICANT CHANGE UP
LIPID PNL WITH DIRECT LDL SERPL: 76 MG/DL — SIGNIFICANT CHANGE UP
LYMPHOCYTES # BLD AUTO: 1.59 K/UL — SIGNIFICANT CHANGE UP (ref 1–3.3)
LYMPHOCYTES # BLD AUTO: 10.4 % — LOW (ref 13–44)
MAGNESIUM SERPL-MCNC: 1.5 MG/DL — LOW (ref 1.6–2.6)
MAGNESIUM SERPL-MCNC: 1.9 MG/DL — SIGNIFICANT CHANGE UP (ref 1.6–2.6)
MCHC RBC-ENTMCNC: 28.8 PG — SIGNIFICANT CHANGE UP (ref 27–34)
MCHC RBC-ENTMCNC: 34.8 G/DL — SIGNIFICANT CHANGE UP (ref 32–36)
MCV RBC AUTO: 82.6 FL — SIGNIFICANT CHANGE UP (ref 80–100)
MONOCYTES # BLD AUTO: 0.4 K/UL — SIGNIFICANT CHANGE UP (ref 0–0.9)
MONOCYTES NFR BLD AUTO: 2.6 % — SIGNIFICANT CHANGE UP (ref 2–14)
NEUTROPHILS # BLD AUTO: 13.17 K/UL — HIGH (ref 1.8–7.4)
NEUTROPHILS NFR BLD AUTO: 86.1 % — HIGH (ref 43–77)
NITRITE UR-MCNC: NEGATIVE — SIGNIFICANT CHANGE UP
NONHDLC SERPL-MCNC: 99 MG/DL — SIGNIFICANT CHANGE UP
NRBC BLD AUTO-RTO: 0 /100 WBCS — SIGNIFICANT CHANGE UP (ref 0–0)
NT-PROBNP SERPL-SCNC: 3633 PG/ML — HIGH (ref 0–450)
OSMOLALITY UR: 132 MOS/KG — SIGNIFICANT CHANGE UP (ref 50–1200)
PH UR: 5.5 — SIGNIFICANT CHANGE UP (ref 5–8)
PHOSPHATE SERPL-MCNC: 2 MG/DL — LOW (ref 2.5–4.5)
PLATELET # BLD AUTO: 405 K/UL — HIGH (ref 150–400)
POTASSIUM SERPL-MCNC: 3.8 MMOL/L — SIGNIFICANT CHANGE UP (ref 3.5–5.3)
POTASSIUM SERPL-SCNC: 3.8 MMOL/L — SIGNIFICANT CHANGE UP (ref 3.5–5.3)
POTASSIUM UR-SCNC: 6 MMOL/L — SIGNIFICANT CHANGE UP
PROT ?TM UR-MCNC: <5 MG/DL — SIGNIFICANT CHANGE UP (ref 0–12)
PROT SERPL-MCNC: 6.3 G/DL — SIGNIFICANT CHANGE UP (ref 6–8.3)
PROT UR-MCNC: NEGATIVE MG/DL — SIGNIFICANT CHANGE UP
PROT/CREAT UR-RTO: SIGNIFICANT CHANGE UP RATIO (ref 0–0.2)
RBC # BLD: 3.79 M/UL — LOW (ref 3.8–5.2)
RBC # FLD: 15.1 % — HIGH (ref 10.3–14.5)
SODIUM SERPL-SCNC: 130 MMOL/L — LOW (ref 135–145)
SODIUM UR-SCNC: 24 MMOL/L — SIGNIFICANT CHANGE UP
SP GR SPEC: 1 — LOW (ref 1–1.03)
TRIGL SERPL-MCNC: 127 MG/DL — SIGNIFICANT CHANGE UP
TROPONIN I, HIGH SENSITIVITY RESULT: 1524.3 NG/L — HIGH
TROPONIN I, HIGH SENSITIVITY RESULT: 1744.9 NG/L — HIGH
TSH SERPL-MCNC: 1.48 UU/ML — SIGNIFICANT CHANGE UP (ref 0.34–4.82)
UROBILINOGEN FLD QL: 0.2 MG/DL — SIGNIFICANT CHANGE UP (ref 0.2–1)
WBC # BLD: 15.3 K/UL — HIGH (ref 3.8–10.5)
WBC # FLD AUTO: 15.3 K/UL — HIGH (ref 3.8–10.5)

## 2025-06-21 RX ORDER — MAGNESIUM SULFATE 500 MG/ML
1 SYRINGE (ML) INJECTION ONCE
Refills: 0 | Status: COMPLETED | OUTPATIENT
Start: 2025-06-21 | End: 2025-06-21

## 2025-06-21 RX ORDER — ASPIRIN 325 MG
81 TABLET ORAL DAILY
Refills: 0 | Status: DISCONTINUED | OUTPATIENT
Start: 2025-06-21 | End: 2025-06-21

## 2025-06-21 RX ORDER — ASPIRIN 325 MG
81 TABLET ORAL DAILY
Refills: 0 | Status: DISCONTINUED | OUTPATIENT
Start: 2025-06-21 | End: 2025-06-24

## 2025-06-21 RX ORDER — ASPIRIN 325 MG
325 TABLET ORAL ONCE
Refills: 0 | Status: COMPLETED | OUTPATIENT
Start: 2025-06-21 | End: 2025-06-21

## 2025-06-21 RX ORDER — ATORVASTATIN CALCIUM 80 MG/1
40 TABLET, FILM COATED ORAL ONCE
Refills: 0 | Status: COMPLETED | OUTPATIENT
Start: 2025-06-21 | End: 2025-06-21

## 2025-06-21 RX ORDER — METOPROLOL SUCCINATE 50 MG/1
12.5 TABLET, EXTENDED RELEASE ORAL ONCE
Refills: 0 | Status: COMPLETED | OUTPATIENT
Start: 2025-06-21 | End: 2025-06-21

## 2025-06-21 RX ADMIN — OXYCODONE HYDROCHLORIDE 5 MILLIGRAM(S): 30 TABLET ORAL at 12:40

## 2025-06-21 RX ADMIN — POLYETHYLENE GLYCOL 3350 17 GRAM(S): 17 POWDER, FOR SOLUTION ORAL at 14:58

## 2025-06-21 RX ADMIN — ATORVASTATIN CALCIUM 40 MILLIGRAM(S): 80 TABLET, FILM COATED ORAL at 02:15

## 2025-06-21 RX ADMIN — OXYCODONE HYDROCHLORIDE 2.5 MILLIGRAM(S): 30 TABLET ORAL at 20:40

## 2025-06-21 RX ADMIN — OXYCODONE HYDROCHLORIDE 5 MILLIGRAM(S): 30 TABLET ORAL at 04:08

## 2025-06-21 RX ADMIN — LIDOCAINE HYDROCHLORIDE 1 PATCH: 20 JELLY TOPICAL at 11:36

## 2025-06-21 RX ADMIN — Medication 75 MILLILITER(S): at 08:37

## 2025-06-21 RX ADMIN — OXYCODONE HYDROCHLORIDE 5 MILLIGRAM(S): 30 TABLET ORAL at 05:10

## 2025-06-21 RX ADMIN — LIDOCAINE HYDROCHLORIDE 1 PATCH: 20 JELLY TOPICAL at 06:55

## 2025-06-21 RX ADMIN — METOPROLOL SUCCINATE 12.5 MILLIGRAM(S): 50 TABLET, EXTENDED RELEASE ORAL at 02:39

## 2025-06-21 RX ADMIN — AMLODIPINE BESYLATE 5 MILLIGRAM(S): 10 TABLET ORAL at 05:40

## 2025-06-21 RX ADMIN — Medication 2 TABLET(S): at 21:26

## 2025-06-21 RX ADMIN — Medication 325 MILLIGRAM(S): at 02:41

## 2025-06-21 RX ADMIN — LIDOCAINE HYDROCHLORIDE 1 PATCH: 20 JELLY TOPICAL at 19:00

## 2025-06-21 RX ADMIN — LOSARTAN POTASSIUM 100 MILLIGRAM(S): 100 TABLET, FILM COATED ORAL at 05:40

## 2025-06-21 RX ADMIN — SODIUM CHLORIDE 75 MILLILITER(S): 9 INJECTION, SOLUTION INTRAVENOUS at 04:08

## 2025-06-21 RX ADMIN — INSULIN LISPRO 2: 100 INJECTION, SOLUTION INTRAVENOUS; SUBCUTANEOUS at 11:45

## 2025-06-21 RX ADMIN — Medication 100 GRAM(S): at 02:28

## 2025-06-21 RX ADMIN — Medication 81 MILLIGRAM(S): at 11:36

## 2025-06-21 RX ADMIN — LIDOCAINE HYDROCHLORIDE 1 PATCH: 20 JELLY TOPICAL at 23:36

## 2025-06-21 RX ADMIN — OXYCODONE HYDROCHLORIDE 2.5 MILLIGRAM(S): 30 TABLET ORAL at 19:47

## 2025-06-21 RX ADMIN — OXYCODONE HYDROCHLORIDE 5 MILLIGRAM(S): 30 TABLET ORAL at 11:39

## 2025-06-21 RX ADMIN — CEFTRIAXONE 100 MILLIGRAM(S): 500 INJECTION, POWDER, FOR SOLUTION INTRAMUSCULAR; INTRAVENOUS at 03:07

## 2025-06-21 RX ADMIN — INSULIN LISPRO 1: 100 INJECTION, SOLUTION INTRAVENOUS; SUBCUTANEOUS at 08:06

## 2025-06-21 RX ADMIN — Medication 25 MICROGRAM(S): at 05:40

## 2025-06-21 NOTE — PROGRESS NOTE ADULT - SUBJECTIVE AND OBJECTIVE BOX
MR#[id number]  PATIENT NAME:SHERLY CARLSON    DATE OF SERVICE: 06-21-25 @ 09:33  Patient was seen and examined by Sunny Palacios MD on    06-21-25 @ 09:33 .  Interim events noted.Consultant notes ,Labs,Telemetry reviewed by me       HOSPITAL COURSE: HPI:  85F from home, [facility] NIDDM, HTN, HLD, hypothyroidism, past surgical history of appendectomy and hysterectomy, presenting with 3 weeks of nonbloody diarrhea and vomiting all night prior to admission. She reports okay PO intake until two days ago and has lost 15 lbs in about 5 weeks. On exam she appears well but complains of severe lower abdominal pain and bilateral flank pain. Imaging suggests bilateral pyelonephritis and new R adnexal mass. She will be admitted for anemia requiring transfusion, antibiotic management of pyelo, and gynecologic evaluation.     Note: daughter Tyler ([phone]) and son Rancho ([phone]) are both MDs. Spoke with Rancho on the phone and he confirmed he will be here later today. Confirmed patient is currently FULL CODE.     ED Course    Vitals: BPmax 193/96  Labs: glu 239; lact 2.6>2.5; vbc co2 38; bicarb 20; trop 202; wbc 11; hb 6.6; UA neg   Intervention: 1uprbc; zofran, pepcid, ctx   Consults: gyn  Images:    CTAP     IMPRESSION:  1. Moderate perivesicular stranding, compatible with cystitis. Mild bilateral pyelonephritis.  2. Prominence of the right adnexa which may represent an ovarian mass. Correlate with pelvic ultrasound.     (20 Jun 2025 13:34)      INTERIM EVENTS:Patient seen at bedside ,interim events noted.      [facility] -reviewed admission note, no change since admission  HEART FAILURE: Acute[ ]Chronic[ ] Systolic[ ] Diastolic[ ] Combined Systolic and Diastolic[ ]  CAD[ ] CABG[ ] PCI[ ]  DEVICES[ ] PPM[ ] ICD[ ] ILR[ ]  ATRIAL FIBRILLATION[ ] Paroxysmal[ ] Permanent[ ] CHADS2-[  ]  BLAKE[ ] CKD1[ ] CKD2[ ] CKD3[ ] CKD4[ ] ESRD[ ]  COPD[ ] HTN[ ]   DM[ ] Type1[ ] Type 2[ ]   CVA[ ] Paresis[ ]    AMBULATION: Assisted[ ] Cane/walker[ ] Independent[ ]    MEDICATIONS  (STANDING):  amLODIPine   Tablet 5 milliGRAM(s) Oral daily  aspirin enteric coated 81 milliGRAM(s) Oral daily  cefTRIAXone   IVPB 2000 milliGRAM(s) IV Intermittent every 24 hours  insulin lispro (ADMELOG) corrective regimen sliding scale   SubCutaneous three times a day before meals  insulin lispro (ADMELOG) corrective regimen sliding scale   SubCutaneous at bedtime  levothyroxine 25 MICROGram(s) Oral daily  lidocaine   4% Patch 1 Patch Transdermal daily  losartan 100 milliGRAM(s) Oral daily  naloxone Injectable 0.4 milliGRAM(s) IV Push once  polyethylene glycol 3350 17 Gram(s) Oral daily  senna 2 Tablet(s) Oral at bedtime  sodium chloride 0.9%. 1000 milliLiter(s) (75 mL/Hr) IV Continuous <Continuous>    MEDICATIONS  (PRN):  aluminum hydroxide/magnesium hydroxide/simethicone Suspension 30 milliLiter(s) Oral every 4 hours PRN Dyspepsia  bisacodyl 5 milliGRAM(s) Oral daily PRN Constipation  melatonin 3 milliGRAM(s) Oral at bedtime PRN Insomnia  ondansetron Injectable 4 milliGRAM(s) IV Push every 8 hours PRN Nausea and/or Vomiting  oxyCODONE    IR 2.5 milliGRAM(s) Oral every 4 hours PRN Moderate Pain (4 - 6)  oxyCODONE    IR 5 milliGRAM(s) Oral every 4 hours PRN Severe Pain (7 - 10)            REVIEW OF SYSTEMS:  Constitutional: [ ] fever, [ ]weight loss,  [ ]fatigue [ ]weight gain  Eyes: [ ] visual changes  Respiratory: [ ]shortness of breath;  [ ] cough, [ ]wheezing, [ ]chills, [ ]hemoptysis  Cardiovascular: [ ] chest pain, [ ]palpitations, [ ]dizziness,  [ ]leg swelling[ ]orthopnea[ ]PND  Gastrointestinal: [ ] abdominal pain, [ ]nausea, [ ]vomiting,  [ ]diarrhea [ ]Constipation [ ]Melena  Genitourinary: [ ] dysuria, [ ] hematuria [ ]Bee  Neurologic: [ ] headaches [ ] tremors[ ]weakness [ ]Paralysis Right[ ] Left[ ]  Skin: [ ] itching, [ ]burning, [ ] rashes  Endocrine: [ ] heat or cold intolerance  Musculoskeletal: [ ] joint pain or swelling; [ ] muscle, back, or extremity pain  Psychiatric: [ ] depression, [ ]anxiety, [ ]mood swings, or [ ]difficulty sleeping  Hematologic: [ ] easy bruising, [ ] bleeding gums    [ ] All remaining systems negative except as per above.   [ ]Unable to obtain.  [x] No change in ROS since admission      Vital Signs Last 24 Hrs  T(C): 36.6 (21 Jun 2025 20:08), Max: 37.1 (21 Jun 2025 15:54)  T(F): 97.9 (21 Jun 2025 20:08), Max: 98.8 (21 Jun 2025 15:54)  HR: 85 (21 Jun 2025 20:08) (71 - 87)  BP: 164/78 (21 Jun 2025 20:08) (143/62 - 164/78)  BP(mean): 94 (21 Jun 2025 03:41) (94 - 94)  RR: 18 (21 Jun 2025 20:08) (18 - 18)  SpO2: 93% (21 Jun 2025 20:08) (93% - 95%)    Parameters below as of 21 Jun 2025 20:08  Patient On (Oxygen Delivery Method): room air      I&O's Summary    21 Jun 2025 07:01  -  21 Jun 2025 23:33  --------------------------------------------------------  IN: 450 mL / OUT: 0 mL / NET: 450 mL        PHYSICAL EXAM:  General: No acute distress BMI-  HEENT: EOMI, PERRL  Neck: Supple, [ ] JVD  Lungs: Equal air entry bilaterally; [ ] rales [ ] wheezing [ ] rhonchi  Heart: Regular rate and rhythm; [x ] murmur   2/6 [ x] systolic [ ] diastolic [ ] radiation[ ] rubs [ ]  gallops  Abdomen: Nontender, bowel sounds present  Extremities: No clubbing, cyanosis, [ ] edema [ ]Pulses  equal and intact  Nervous system:  Alert & Oriented X3, no focal deficits  Psychiatric: Normal affect  Skin: No rashes or lesions    LABS:  06-21    130[L]  |  103  |  10  ----------------------------<  146[H]  3.8   |  22  |  0.73    Ca    8.0[L]      21 Jun 2025 05:39  Phos  2.0     06-21  Mg     1.9     06-21    TPro  6.3  /  Alb  2.6[L]  /  TBili  0.2  /  DBili  x   /  AST  18  /  ALT  12  /  AlkPhos  55  06-21    Creatinine Trend: 0.73<--, 0.86<--, 1.03<--                        10.9   15.30 )-----------( 405      ( 21 Jun 2025 05:39 )             31.3

## 2025-06-21 NOTE — CHART NOTE - NSCHARTNOTEFT_GEN_A_CORE
Update note s/p transfer telemetry from 6S overnight  85 F admitted for anemia requiring transfusion, antibiotic management of pyelo, and gynecologic evaluation for ovarian mass. GYN recs transvaginal US. Started 1g Ceftriaxone. UA negative, Bcx in progress.  Pt transferred to telemetry for up-trended Trop and abnormal EKG with Afib w/ RVR, 1st degree AV block. s/p ASA 325mg and Metoprolol 12.5mg x 1 dose overnight. Cardiology following. Trop trended down to 1524.     ------------------------------------------------------------------------------------------------  REVIEW OF SYSTEMS:  CONSTITUTIONAL: No fever, chills  ENMT:  No difficulty hearing, no change in vision  NECK: No pain or stiffness  RESPIRATORY: No cough, SOB  CARDIOVASCULAR: No chest pain, palpitations  GASTROINTESTINAL: No abdominal pain. No nausea, vomiting, or diarrhea  GENITOURINARY: No dysuria  NEUROLOGICAL: No HA  SKIN: No itching, burning, rashes, or lesions   LYMPH NODES: No enlarged glands  ENDOCRINE: No heat or cold intolerance; No hair loss  MUSCULOSKELETAL: No joint pain or swelling; No muscle, back, or extremity pain  PSYCHIATRIC: No depression, anxiety  HEME/LYMPH: No easy bruising, or bleeding gums  -------------------------------------------------------------------------------------------------    OBJECTIVE:  PHYSICAL EXAM:  GENERAL: NAD  EYES: clear conjunctiva; EOMI  ENMT: Moist mucous membranes  NECK: Supple, No JVD, Normal thyroid  CHEST/LUNG: Clear to auscultation bilaterally; No rales, rhonchi, wheezing, or rubs  HEART: S1, S2, Regular rate and rhythm  ABDOMEN: Soft, Nontender, Nondistended; Bowel sounds present  NEURO: Alert & Oriented X3  EXTREMITIES: No LE edema, no calf tenderness  LYMPH: No lymphadenopathy noted  SKIN: No rashes or lesions    Vital Signs Last 24 Hrs  T(C): 36.9 (21 Jun 2025 11:07), Max: 37.1 (20 Jun 2025 20:28)  T(F): 98.4 (21 Jun 2025 11:07), Max: 98.7 (20 Jun 2025 20:28)  HR: 82 (21 Jun 2025 11:07) (71 - 129)  BP: 143/62 (21 Jun 2025 11:07) (127/79 - 161/88)  BP(mean): 94 (21 Jun 2025 03:41) (94 - 94)  RR: 18 (21 Jun 2025 11:07) (18 - 20)  SpO2: 95% (21 Jun 2025 11:07) (93% - 95%)    Parameters below as of 21 Jun 2025 11:07  Patient On (Oxygen Delivery Method): room air    LABS:                        10.9   15.30 )-----------( 405      ( 21 Jun 2025 05:39 )             31.3   CARDIAC MARKERS ( 21 Jun 2025 00:22 )  x     / x     / x     / x     / 8.0 ng/mL    06-21    130[L]  |  103  |  10  ----------------------------<  146[H]  3.8   |  22  |  0.73    Ca    8.0[L]      21 Jun 2025 05:39  Phos  2.0     06-21  Mg     1.9     06-21    TPro  6.3  /  Alb  2.6[L]  /  TBili  0.2  /  DBili  x   /  AST  18  /  ALT  12  /  AlkPhos  55  06-21    ---------------------------------------------------------------------------------------------------------  Telemetry : Sinus rhythm  IMGAGING:  < from: CT Chest w/ IV Cont (06.20.25 @ 11:59) >  ACC: [id number] EXAM:  CT CHEST IC   ORDERED BY: TREVOR DAVE   ACC: [id number] EXAM:  CT ANGIO ABD PELV (W)AW IC   ORDERED BY: TREVOR DAVE     IMPRESSION:  1. Moderate perivesicular stranding, compatible with cystitis. Mild   bilateral pyelonephritis.  2. Prominence of theright adnexa which may represent an ovarian mass.   Correlate with pelvic ultrasound.    < end of copied text >  -----------------------------------------------------------------------------------------------------------------------------------------------   ASSESSMENT:  HPI:  85F from home, [facility] NIDDM, HTN, HLD, hypothyroidism, past surgical history of appendectomy and hysterectomy, presenting with 3 weeks of nonbloody diarrhea and vomiting all night prior to admission. She reports okay PO intake until two days ago and has lost 15 lbs in about 5 weeks. On exam she appears well but complains of severe lower abdominal pain and bilateral flank pain. Imaging suggests bilateral pyelonephritis and new R adnexal mass.   She will be admitted for anemia requiring transfusion, antibiotic management of pyelo, and gynecologic evaluation for ovarian mass.     Note: daughter Tyler ([phone]) and son Rancho ([phone]) are both MDs. Spoke with Rancho on the phone and he confirmed he will be here later today. Confirmed patient is currently FULL CODE.         PLAN:   Symptomatic anemia(hg 6.6), s/p 1unit PRBC, Hgb 10.9  Acute cystitis, mild b/l pyelonephritis  Ovarian mass, daughter reports recent weight loss >15lbs x 5weeks, N/V/non bloody diarrhea x 3weeks  DM A1C 6.5  HTN  Hypothyroid  Leukocytosis improving  Hyponatremia Na 130    -Heme/onc dr. Sifuentes consulted for anemia, possible malignancy work up  -continue 1g Ceftriaxone  -IV hydration with NS 0.9@75ml x 24h  -Monitor CBC/BMP, Blood culture, Stool study if diarrhea persists   -will hold off ID consult for now.   -f/u Echo, STRESS test per cardiology dr. Palacios  -follow up GYN Ca 125, AFP, CEA, LDH.   -f/u GYN reccs, Transvaginal US  -Will consult GI on Monday Update note s/p transfer telemetry from 6S overnight  85 F admitted for anemia requiring transfusion, antibiotic management of pyelo, and gynecologic evaluation for ovarian mass. GYN recs transvaginal US. Started 1g Ceftriaxone. UA negative, Bcx in progress.  Pt transferred to telemetry for up-trended Trop and abnormal EKG with Afib w/ RVR, 1st degree AV block. s/p ASA 325mg and Metoprolol 12.5mg x 1 dose overnight. Cardiology following. Trop trended down to 1524.     ------------------------------------------------------------------------------------------------  REVIEW OF SYSTEMS:  CONSTITUTIONAL: No fever, chills  ENMT:  No difficulty hearing, no change in vision  NECK: No pain or stiffness  RESPIRATORY: No cough, SOB  CARDIOVASCULAR: No chest pain, palpitations  GASTROINTESTINAL: No abdominal pain. No nausea, vomiting, or diarrhea  GENITOURINARY: No dysuria  NEUROLOGICAL: No HA  SKIN: No itching, burning, rashes, or lesions   LYMPH NODES: No enlarged glands  ENDOCRINE: No heat or cold intolerance; No hair loss  MUSCULOSKELETAL: No joint pain or swelling; No muscle, back, or extremity pain  PSYCHIATRIC: No depression, anxiety  HEME/LYMPH: No easy bruising, or bleeding gums  -------------------------------------------------------------------------------------------------    OBJECTIVE:  PHYSICAL EXAM:  GENERAL: NAD  EYES: clear conjunctiva; EOMI  ENMT: Moist mucous membranes  NECK: Supple, No JVD, Normal thyroid  CHEST/LUNG: Clear to auscultation bilaterally; No rales, rhonchi, wheezing, or rubs  HEART: S1, S2, Regular rate and rhythm  ABDOMEN: Soft, Nontender, Nondistended; Bowel sounds present  NEURO: Alert & Oriented X3  EXTREMITIES: No LE edema, no calf tenderness  LYMPH: No lymphadenopathy noted  SKIN: No rashes or lesions    Vital Signs Last 24 Hrs  T(C): 36.9 (21 Jun 2025 11:07), Max: 37.1 (20 Jun 2025 20:28)  T(F): 98.4 (21 Jun 2025 11:07), Max: 98.7 (20 Jun 2025 20:28)  HR: 82 (21 Jun 2025 11:07) (71 - 129)  BP: 143/62 (21 Jun 2025 11:07) (127/79 - 161/88)  BP(mean): 94 (21 Jun 2025 03:41) (94 - 94)  RR: 18 (21 Jun 2025 11:07) (18 - 20)  SpO2: 95% (21 Jun 2025 11:07) (93% - 95%)    Parameters below as of 21 Jun 2025 11:07  Patient On (Oxygen Delivery Method): room air    LABS:                        10.9   15.30 )-----------( 405      ( 21 Jun 2025 05:39 )             31.3   CARDIAC MARKERS ( 21 Jun 2025 00:22 )  x     / x     / x     / x     / 8.0 ng/mL    06-21    130[L]  |  103  |  10  ----------------------------<  146[H]  3.8   |  22  |  0.73    Ca    8.0[L]      21 Jun 2025 05:39  Phos  2.0     06-21  Mg     1.9     06-21    TPro  6.3  /  Alb  2.6[L]  /  TBili  0.2  /  DBili  x   /  AST  18  /  ALT  12  /  AlkPhos  55  06-21    ---------------------------------------------------------------------------------------------------------  Telemetry : Sinus rhythm  IMGAGING:  < from: CT Chest w/ IV Cont (06.20.25 @ 11:59) >  ACC: [id number] EXAM:  CT CHEST IC   ORDERED BY: TREVOR DAVE   ACC: [id number] EXAM:  CT ANGIO ABD PELV (W)AW IC   ORDERED BY: TREVOR DAVE     IMPRESSION:  1. Moderate perivesicular stranding, compatible with cystitis. Mild   bilateral pyelonephritis.  2. Prominence of theright adnexa which may represent an ovarian mass.   Correlate with pelvic ultrasound.    < end of copied text >  -----------------------------------------------------------------------------------------------------------------------------------------------   ASSESSMENT:  HPI:  85F from home, [facility] NIDDM, HTN, HLD, hypothyroidism, past surgical history of appendectomy and hysterectomy, presenting with 3 weeks of nonbloody diarrhea and vomiting all night prior to admission. She reports okay PO intake until two days ago and has lost 15 lbs in about 5 weeks. On exam she appears well but complains of severe lower abdominal pain and bilateral flank pain. Imaging suggests bilateral pyelonephritis and new R adnexal mass.   She will be admitted for anemia requiring transfusion, antibiotic management of pyelo, and gynecologic evaluation for ovarian mass.     Note: daughter Tyler ([phone]) and son Rancho ([phone]) are both MDs. Spoke with Rancho on the phone and he confirmed he will be here later today. Confirmed patient is currently FULL CODE.         PLAN:   Demand ischemia with elevated trop  likely due to anemia, infection  Afib w/ RVR, 1st degree AVB, now sinus rhythm on telemetry   Symptomatic anemia(hg 6.6), s/p 1unit PRBC, Hgb 10.9  Acute cystitis, mild b/l pyelonephritis  Ovarian mass, daughter reports recent weight loss >15lbs x 5weeks, N/V/non bloody diarrhea x 3weeks  DM A1C 6.5  HTN  Hypothyroid  Leukocytosis improving  Hyponatremia Na 130    -continue Telemetry  -f/u Echo, STRESS test per cardiology dr. Palacios  -Heme/onc dr. Sifuentes consulted for anemia, possible malignancy work up  -continue 1g Ceftriaxone, will hold off ID consult for now.   -IV hydration with NS 0.9@75ml x 24h  -Monitor CBC/BMP, Blood culture, Stool study if diarrhea persists   -follow up GYN Ca 125, AFP, CEA, LDH.   -f/u GYN reccs, Transvaginal US  -Will consult GI on Monday    Updated all plan above to two daughters at bedside.

## 2025-06-21 NOTE — CHART NOTE - NSCHARTNOTEFT_GEN_A_CORE
EVENT:   6/21/25, 8:30pm,patient admitted with dx .Pyelonephritis, n & v, diarrhea w/new ovarian mass, had WBC - 21.52 (on Rocephin IV), s. lactate- 2.0, Mg- 1.0>1.5 (supplement. given),Trop.#1-202.8 > Trop#2- 1648.7 >Trop.#3- 1744.9. Stat EKG showed Sinus rhythm w/premature supraventricular complexes. Poor R-wave progression; consider anterolateral infarct, lead placement. Patient was assessed at bedside. AOx3. She denied any chest pain or discomfort. At 12am, BNP- 3633,CPK mass assay-6.1,CKMB- 8.0. Nocturnist Zakiya Child was called for consult. Stat :  mg po x 1dose,Atorvastatin 40 mg po x 1dose, Metoprolol 12.5 mg po x 1 dose given. ASA 81mg po Q Daily ordered. TTE w or wo ultrasound enhancing agent ordered in am. Remote tele/EKG @ bedside ordered. RN was instructed to call nursing supervisor for bed available on telemetry unit.   HPI:  85F from home, [facility]. NIDDM, HTN, HLD, hypothyroidism, past surgical history of appendectomy and hysterectomy, presenting with 3 weeks of nonbloody diarrhea and vomiting all night prior to admission. She reports okay PO intake until two days ago and has lost 15 lbs in about 5 weeks. On exam she appears well but complains of severe lower abdominal pain and bilateral flank pain. Imaging suggests bilateral pyelonephritis and new R adnexal mass. She will be admitted for anemia requiring transfusion, antibiotic management of pyelo, and gynecologic evaluation. Note: daughter Tyler ([phone]) and son Rancho ([phone]) are both MDs. Spoke with Rancho on the phone and he confirmed he will be here later today. Confirmed patient is currently FULL CODE.   OBJECTIVE:  Vital Signs Last 24 Hrs  T(C): 37.1 (20 Jun 2025 20:28), Max: 37.1 (20 Jun 2025 20:28)  T(F): 98.7 (20 Jun 2025 20:28), Max: 98.7 (20 Jun 2025 20:28)  HR: 113 (20 Jun 2025 20:28) (95 - 129)  BP: 130/73 (20 Jun 2025 20:28) (127/79 - 193/96)  BP(mean): --  RR: 18 (20 Jun 2025 20:28) (16 - 20)  SpO2: 95% (20 Jun 2025 20:28) (93% - 98%)  Parameters below as of 20 Jun 2025 20:28  Patient On (Oxygen Delivery Method): room air    LABS:                       12.2 21.52 )-----------( 471      ( 20 Jun 2025 20:13 )             35.1   CARDIAC MARKERS ( 21 Jun 2025 00:22 )  x     / x     / x     / x     / 8.0 ng/mL    06-20    132[L]  |  104  |  11  ----------------------------<  155[H]  3.9   |  21[L]  |  0.86    Ca    8.6      20 Jun 2025 20:13  Phos  2.8     06-20  Mg     1.5     06-21    TPro  7.9  /  Alb  3.6  /  TBili  0.3  /  DBili  x   /  AST  13  /  ALT  14  /  AlkPhos  75  06-2    PLAN:   - Re-evaluate patient VS,  - ASA 81mg po Q Daily,  - TTE w or wo enhancing agent in am,  - Remote tele/EKG @ bedside,  - Transfer pt. to telemetry unit.

## 2025-06-21 NOTE — PROGRESS NOTE ADULT - PROBLEM SELECTOR PLAN 4
15lbs weight loss in about a month   s/p hysterectomy     CTAP: Prominence of the right adnexa which may represent an ovarian mass. Correlate with pelvic ultrasound.    pain control: 2.5, 5 oxy for moderate/severe pain + lidocaine + hot/cold packs   obgyn consulted

## 2025-06-22 LAB
ALBUMIN SERPL ELPH-MCNC: 2.5 G/DL — LOW (ref 3.5–5)
ALP SERPL-CCNC: 54 U/L — SIGNIFICANT CHANGE UP (ref 40–120)
ALT FLD-CCNC: 11 U/L DA — SIGNIFICANT CHANGE UP (ref 10–60)
ANION GAP SERPL CALC-SCNC: 1 MMOL/L — LOW (ref 5–17)
AST SERPL-CCNC: 14 U/L — SIGNIFICANT CHANGE UP (ref 10–40)
BASOPHILS # BLD AUTO: 0.03 K/UL — SIGNIFICANT CHANGE UP (ref 0–0.2)
BASOPHILS NFR BLD AUTO: 0.2 % — SIGNIFICANT CHANGE UP (ref 0–2)
BILIRUB SERPL-MCNC: 0.2 MG/DL — SIGNIFICANT CHANGE UP (ref 0.2–1.2)
BUN SERPL-MCNC: 10 MG/DL — SIGNIFICANT CHANGE UP (ref 7–18)
CALCIUM SERPL-MCNC: 7.7 MG/DL — LOW (ref 8.4–10.5)
CHLORIDE SERPL-SCNC: 104 MMOL/L — SIGNIFICANT CHANGE UP (ref 96–108)
CO2 SERPL-SCNC: 27 MMOL/L — SIGNIFICANT CHANGE UP (ref 22–31)
CREAT SERPL-MCNC: 0.64 MG/DL — SIGNIFICANT CHANGE UP (ref 0.5–1.3)
EGFR: 88 ML/MIN/1.73M2 — SIGNIFICANT CHANGE UP
EGFR: 88 ML/MIN/1.73M2 — SIGNIFICANT CHANGE UP
EOSINOPHIL # BLD AUTO: 0.12 K/UL — SIGNIFICANT CHANGE UP (ref 0–0.5)
EOSINOPHIL NFR BLD AUTO: 0.9 % — SIGNIFICANT CHANGE UP (ref 0–6)
GI PCR PANEL: SIGNIFICANT CHANGE UP
GLUCOSE BLDC GLUCOMTR-MCNC: 150 MG/DL — HIGH (ref 70–99)
GLUCOSE BLDC GLUCOMTR-MCNC: 152 MG/DL — HIGH (ref 70–99)
GLUCOSE BLDC GLUCOMTR-MCNC: 201 MG/DL — HIGH (ref 70–99)
GLUCOSE BLDC GLUCOMTR-MCNC: 97 MG/DL — SIGNIFICANT CHANGE UP (ref 70–99)
GLUCOSE SERPL-MCNC: 155 MG/DL — HIGH (ref 70–99)
HCT VFR BLD CALC: 32.1 % — LOW (ref 34.5–45)
HGB BLD-MCNC: 11.4 G/DL — LOW (ref 11.5–15.5)
IMM GRANULOCYTES NFR BLD AUTO: 0.5 % — SIGNIFICANT CHANGE UP (ref 0–0.9)
LDH SERPL L TO P-CCNC: 148 U/L — SIGNIFICANT CHANGE UP (ref 120–225)
LYMPHOCYTES # BLD AUTO: 1.7 K/UL — SIGNIFICANT CHANGE UP (ref 1–3.3)
LYMPHOCYTES # BLD AUTO: 13.3 % — SIGNIFICANT CHANGE UP (ref 13–44)
MAGNESIUM SERPL-MCNC: 1.3 MG/DL — LOW (ref 1.6–2.6)
MCHC RBC-ENTMCNC: 29.2 PG — SIGNIFICANT CHANGE UP (ref 27–34)
MCHC RBC-ENTMCNC: 35.5 G/DL — SIGNIFICANT CHANGE UP (ref 32–36)
MCV RBC AUTO: 82.3 FL — SIGNIFICANT CHANGE UP (ref 80–100)
MONOCYTES # BLD AUTO: 0.58 K/UL — SIGNIFICANT CHANGE UP (ref 0–0.9)
MONOCYTES NFR BLD AUTO: 4.5 % — SIGNIFICANT CHANGE UP (ref 2–14)
NEUTROPHILS # BLD AUTO: 10.28 K/UL — HIGH (ref 1.8–7.4)
NEUTROPHILS NFR BLD AUTO: 80.6 % — HIGH (ref 43–77)
NRBC BLD AUTO-RTO: 0 /100 WBCS — SIGNIFICANT CHANGE UP (ref 0–0)
PHOSPHATE SERPL-MCNC: 1.6 MG/DL — LOW (ref 2.5–4.5)
PLATELET # BLD AUTO: 408 K/UL — HIGH (ref 150–400)
POTASSIUM SERPL-MCNC: 3.5 MMOL/L — SIGNIFICANT CHANGE UP (ref 3.5–5.3)
POTASSIUM SERPL-SCNC: 3.5 MMOL/L — SIGNIFICANT CHANGE UP (ref 3.5–5.3)
PROT SERPL-MCNC: 6.2 G/DL — SIGNIFICANT CHANGE UP (ref 6–8.3)
RBC # BLD: 3.9 M/UL — SIGNIFICANT CHANGE UP (ref 3.8–5.2)
RBC # FLD: 14.7 % — HIGH (ref 10.3–14.5)
SODIUM SERPL-SCNC: 132 MMOL/L — LOW (ref 135–145)
UUN UR-MCNC: 129 MG/DL — SIGNIFICANT CHANGE UP
WBC # BLD: 12.78 K/UL — HIGH (ref 3.8–10.5)
WBC # FLD AUTO: 12.78 K/UL — HIGH (ref 3.8–10.5)

## 2025-06-22 RX ORDER — IRON SUCROSE 20 MG/ML
200 INJECTION, SOLUTION INTRAVENOUS ONCE
Refills: 0 | Status: DISCONTINUED | OUTPATIENT
Start: 2025-06-22 | End: 2025-06-22

## 2025-06-22 RX ORDER — MAGNESIUM SULFATE 500 MG/ML
1 SYRINGE (ML) INJECTION ONCE
Refills: 0 | Status: COMPLETED | OUTPATIENT
Start: 2025-06-22 | End: 2025-06-22

## 2025-06-22 RX ORDER — IRON SUCROSE 20 MG/ML
200 INJECTION, SOLUTION INTRAVENOUS ONCE
Refills: 0 | Status: COMPLETED | OUTPATIENT
Start: 2025-06-22 | End: 2025-06-22

## 2025-06-22 RX ORDER — MAGNESIUM SULFATE 500 MG/ML
2 SYRINGE (ML) INJECTION ONCE
Refills: 0 | Status: COMPLETED | OUTPATIENT
Start: 2025-06-22 | End: 2025-06-22

## 2025-06-22 RX ORDER — POTASSIUM PHOSPHATE, MONOBASIC POTASSIUM PHOSPHATE, DIBASIC INJECTION, 236; 224 MG/ML; MG/ML
15 SOLUTION, CONCENTRATE INTRAVENOUS ONCE
Refills: 0 | Status: COMPLETED | OUTPATIENT
Start: 2025-06-22 | End: 2025-06-22

## 2025-06-22 RX ADMIN — Medication 75 MILLILITER(S): at 20:41

## 2025-06-22 RX ADMIN — IRON SUCROSE 110 MILLIGRAM(S): 20 INJECTION, SOLUTION INTRAVENOUS at 14:55

## 2025-06-22 RX ADMIN — Medication 25 GRAM(S): at 14:55

## 2025-06-22 RX ADMIN — POTASSIUM PHOSPHATE, MONOBASIC POTASSIUM PHOSPHATE, DIBASIC INJECTION, 62.5 MILLIMOLE(S): 236; 224 SOLUTION, CONCENTRATE INTRAVENOUS at 08:32

## 2025-06-22 RX ADMIN — LOSARTAN POTASSIUM 100 MILLIGRAM(S): 100 TABLET, FILM COATED ORAL at 06:17

## 2025-06-22 RX ADMIN — LIDOCAINE HYDROCHLORIDE 1 PATCH: 20 JELLY TOPICAL at 22:40

## 2025-06-22 RX ADMIN — LIDOCAINE HYDROCHLORIDE 1 PATCH: 20 JELLY TOPICAL at 12:00

## 2025-06-22 RX ADMIN — Medication 81 MILLIGRAM(S): at 11:56

## 2025-06-22 RX ADMIN — Medication 100 GRAM(S): at 08:32

## 2025-06-22 RX ADMIN — OXYCODONE HYDROCHLORIDE 5 MILLIGRAM(S): 30 TABLET ORAL at 10:23

## 2025-06-22 RX ADMIN — AMLODIPINE BESYLATE 5 MILLIGRAM(S): 10 TABLET ORAL at 06:17

## 2025-06-22 RX ADMIN — POLYETHYLENE GLYCOL 3350 17 GRAM(S): 17 POWDER, FOR SOLUTION ORAL at 11:58

## 2025-06-22 RX ADMIN — INSULIN LISPRO 2: 100 INJECTION, SOLUTION INTRAVENOUS; SUBCUTANEOUS at 11:56

## 2025-06-22 RX ADMIN — Medication 25 MICROGRAM(S): at 06:17

## 2025-06-22 RX ADMIN — CEFTRIAXONE 100 MILLIGRAM(S): 500 INJECTION, POWDER, FOR SOLUTION INTRAMUSCULAR; INTRAVENOUS at 02:49

## 2025-06-22 RX ADMIN — OXYCODONE HYDROCHLORIDE 5 MILLIGRAM(S): 30 TABLET ORAL at 09:13

## 2025-06-22 NOTE — DISCHARGE NOTE PROVIDER - HOSPITAL COURSE
Pt is a 85F from home, pmhx NIDDM, HTN, HLD, hypothyroidism, past surgical history of appendectomy and hysterectomy, presenting with 3 weeks of nonbloody diarrhea and vomiting all night prior to admission. She reports okay PO intake until two days ago and has lost 15 lbs in about 5 weeks. On exam she appears well but complains of severe lower abdominal pain and bilateral flank pain. Imaging suggests bilateral pyelonephritis and new R adnexal mass.     Note: daughter Tyler ([phone]) and son Rancho ([phone]) are both MDs. Spoke with Rancho on the phone and he confirmed he will be here later today. Confirmed patient is currently FULL CODE.   ED Course:  Vitals: BPmax 193/96, Labs: glu 239; lact 2.6>2.5; vbc co2 38; bicarb 20; trop 202; wbc 11; hb 6.6; UA neg.  Pt received 1unit of prbc; zofran, pepcid, ctx   Consults was consulted gyn    She was admitted for anemia requiring transfusion, antibiotic management of pyelo, and gynecologic evaluation.   GYN requested transvaginal US.    Pt's troponin was 1744 and underwent a nuclear stress test which showed ;;;;;;;;;;;;;;;;;;;;;;;;;;;;;;;;;;;;;;;;;;;;;    INCOMPLETE::::::::::06/22     Pt is a 85F from home, pmhx NIDDM, HTN, HLD, hypothyroidism, past surgical history of appendectomy and hysterectomy, presenting with 3 weeks of nonbloody diarrhea and vomiting all night prior to admission. She reports okay PO intake until two days ago and has lost 15 lbs in about 5 weeks. On exam she appears well but complains of severe lower abdominal pain and bilateral flank pain. Imaging suggests bilateral pyelonephritis and new R adnexal mass.     Note: daughter Tyler ([phone]) and son Rancho ([phone]) are both MDs. Spoke with Rancho on the phone and he confirmed he will be here later today. Confirmed patient is currently FULL CODE.   ED Course:  Vitals: BPmax 193/96, Labs: glu 239; lact 2.6>2.5; vbc co2 38; bicarb 20; trop 202; wbc 11; hb 6.6; UA neg.  Pt received 1unit of prbc; zofran, pepcid, ctx   Consults was consulted gyn    Of note, she received 1 unit of PRBC, and her hgb went from 6.6 to 12.2 (rechecked next day and was 11).  She was rec for f/u with PCP and outpatient GI.    She was admitted for anemia requiring transfusion, antibiotic management of pyelo, and gynecologic evaluation.   GYN requested transvaginal US which showed uremarkable appearance of right ovary. She was cleared by OB/GYN for discharge who did not recommend any further work-up.  Pt to f/u with PCP.     Pt's troponin was 1744 and underwent a nuclear stress test which showed no ischemia.  She was cleared from Cardio perspective for any further work-up.         Pt is a 85F from home, pmhx NIDDM, HTN, HLD, hypothyroidism, past surgical history of appendectomy and hysterectomy, presenting with 3 weeks of nonbloody diarrhea and vomiting all night prior to admission. She reports okay PO intake until two days ago and has lost 15 lbs in about 5 weeks. On exam she appears well but complains of severe lower abdominal pain and bilateral flank pain. Imaging suggests bilateral pyelonephritis and new R adnexal mass.     Note: daughter Tyler ([phone]) and son Rancho ([phone]) are both MDs. Spoke with Rancho on the phone and he confirmed he will be here later today. Confirmed patient is currently FULL CODE.   ED Course:  Vitals: BPmax 193/96, Labs: glu 239; lact 2.6>2.5; vbc co2 38; bicarb 20; trop 202; wbc 11; hb 6.6; UA neg.  Pt received 1unit of prbc; zofran, pepcid, ctx     Pt was started on rocephin 2g for concern of pyelo, UA was neg. Recommend to continue antibiotics for 5 days after discharge as a precaution.  Consults was consulted gyn  Of note, she received 1 unit of PRBC, and her hgb went from 6.6 to 12.2 (rechecked next day and was 11).  She was rec for f/u with PCP and outpatient GI.    She was admitted for anemia requiring transfusion, antibiotic management of pyelo, and gynecologic evaluation.   GYN requested transvaginal US which showed uremarkable appearance of right ovary. She was cleared by OB/GYN for discharge who did not recommend any further work-up.  Pt to f/u with PCP.     Pt's troponin was 1744 and underwent a nuclear stress test which showed no ischemia.  She was cleared from Cardio perspective for any further work-up.

## 2025-06-22 NOTE — DISCHARGE NOTE PROVIDER - REASON FOR ADMISSION
nausea/vomiting with new ovarian mass anemia, nausea/vomiting, and possible ovarian mass seen on CT imaging

## 2025-06-22 NOTE — PROGRESS NOTE ADULT - SUBJECTIVE AND OBJECTIVE BOX
MR#[id number]  PATIENT NAME:SHERLY CARLSON    DATE OF SERVICE: 06-22-25 @ 09:14  Patient was seen and examined by Sunny Palacios MD on    06-22-25 @ 09:14 .  Interim events noted.Consultant notes ,Labs,Telemetry reviewed by me       HOSPITAL COURSE: HPI:  85F from home, [facility] NIDDM, HTN, HLD, hypothyroidism, past surgical history of appendectomy and hysterectomy, presenting with 3 weeks of nonbloody diarrhea and vomiting all night prior to admission. She reports okay PO intake until two days ago and has lost 15 lbs in about 5 weeks. On exam she appears well but complains of severe lower abdominal pain and bilateral flank pain. Imaging suggests bilateral pyelonephritis and new R adnexal mass. She will be admitted for anemia requiring transfusion, antibiotic management of pyelo, and gynecologic evaluation.     Note: daughter Tyler ([phone]) and son Rancho ([phone]) are both MDs. Spoke with Rancho on the phone and he confirmed he will be here later today. Confirmed patient is currently FULL CODE.     ED Course    Vitals: BPmax 193/96  Labs: glu 239; lact 2.6>2.5; vbc co2 38; bicarb 20; trop 202; wbc 11; hb 6.6; UA neg   Intervention: 1uprbc; zofran, pepcid, ctx   Consults: gyn  Images:    CTAP     IMPRESSION:  1. Moderate perivesicular stranding, compatible with cystitis. Mild bilateral pyelonephritis.  2. Prominence of the right adnexa which may represent an ovarian mass. Correlate with pelvic ultrasound.     (20 Jun 2025 13:34)      INTERIM EVENTS:Patient seen at bedside ,interim events noted.      [facility] -reviewed admission note, no change since admission  HEART FAILURE: Acute[ ]Chronic[ ] Systolic[ ] Diastolic[ ] Combined Systolic and Diastolic[ ]  CAD[ ] CABG[ ] PCI[ ]  DEVICES[ ] PPM[ ] ICD[ ] ILR[ ]  ATRIAL FIBRILLATION[ ] Paroxysmal[ ] Permanent[ ] CHADS2-[  ]  BLAKE[ ] CKD1[ ] CKD2[ ] CKD3[ ] CKD4[ ] ESRD[ ]  COPD[ ] HTN[ ]   DM[ ] Type1[ ] Type 2[ ]   CVA[ ] Paresis[ ]    AMBULATION: Assisted[ ] Cane/walker[ ] Independent[ ]    MEDICATIONS  (STANDING):  amLODIPine   Tablet 5 milliGRAM(s) Oral daily  aspirin enteric coated 81 milliGRAM(s) Oral daily  cefTRIAXone   IVPB 2000 milliGRAM(s) IV Intermittent every 24 hours  insulin lispro (ADMELOG) corrective regimen sliding scale   SubCutaneous three times a day before meals  insulin lispro (ADMELOG) corrective regimen sliding scale   SubCutaneous at bedtime  levothyroxine 25 MICROGram(s) Oral daily  lidocaine   4% Patch 1 Patch Transdermal daily  losartan 100 milliGRAM(s) Oral daily  naloxone Injectable 0.4 milliGRAM(s) IV Push once  polyethylene glycol 3350 17 Gram(s) Oral daily  senna 2 Tablet(s) Oral at bedtime  sodium chloride 0.9%. 1000 milliLiter(s) (75 mL/Hr) IV Continuous <Continuous>    MEDICATIONS  (PRN):  aluminum hydroxide/magnesium hydroxide/simethicone Suspension 30 milliLiter(s) Oral every 4 hours PRN Dyspepsia  bisacodyl 5 milliGRAM(s) Oral daily PRN Constipation  melatonin 3 milliGRAM(s) Oral at bedtime PRN Insomnia  ondansetron Injectable 4 milliGRAM(s) IV Push every 8 hours PRN Nausea and/or Vomiting  oxyCODONE    IR 2.5 milliGRAM(s) Oral every 4 hours PRN Moderate Pain (4 - 6)  oxyCODONE    IR 5 milliGRAM(s) Oral every 4 hours PRN Severe Pain (7 - 10)            REVIEW OF SYSTEMS:  Constitutional: [ ] fever, [ ]weight loss,  [ ]fatigue [ ]weight gain  Eyes: [ ] visual changes  Respiratory: [ ]shortness of breath;  [ ] cough, [ ]wheezing, [ ]chills, [ ]hemoptysis  Cardiovascular: [ ] chest pain, [ ]palpitations, [ ]dizziness,  [ ]leg swelling[ ]orthopnea[ ]PND  Gastrointestinal: [ ] abdominal pain, [ ]nausea, [ ]vomiting,  [ ]diarrhea [ ]Constipation [ ]Melena  Genitourinary: [ ] dysuria, [ ] hematuria [ ]Bee  Neurologic: [ ] headaches [ ] tremors[ ]weakness [ ]Paralysis Right[ ] Left[ ]  Skin: [ ] itching, [ ]burning, [ ] rashes  Endocrine: [ ] heat or cold intolerance  Musculoskeletal: [ ] joint pain or swelling; [ ] muscle, back, or extremity pain  Psychiatric: [ ] depression, [ ]anxiety, [ ]mood swings, or [ ]difficulty sleeping  Hematologic: [ ] easy bruising, [ ] bleeding gums    [ ] All remaining systems negative except as per above.   [ ]Unable to obtain.  [x] No change in ROS since admission      Vital Signs Last 24 Hrs  T(C): 36.5 (22 Jun 2025 19:55), Max: 37.1 (22 Jun 2025 16:25)  T(F): 97.7 (22 Jun 2025 19:55), Max: 98.8 (22 Jun 2025 16:25)  HR: 88 (22 Jun 2025 19:55) (88 - 94)  BP: 143/83 (22 Jun 2025 19:55) (120/80 - 159/80)  BP(mean): 100 (22 Jun 2025 04:32) (100 - 199)  RR: 18 (22 Jun 2025 19:55) (18 - 18)  SpO2: 93% (22 Jun 2025 19:55) (92% - 96%)    Parameters below as of 22 Jun 2025 19:55  Patient On (Oxygen Delivery Method): room air      I&O's Summary    21 Jun 2025 07:01  -  22 Jun 2025 07:00  --------------------------------------------------------  IN: 450 mL / OUT: 0 mL / NET: 450 mL    22 Jun 2025 07:01  -  22 Jun 2025 22:14  --------------------------------------------------------  IN: 1332 mL / OUT: 0 mL / NET: 1332 mL        PHYSICAL EXAM:  General: No acute distress BMI-  HEENT: EOMI, PERRL  Neck: Supple, [ ] JVD  Lungs: Equal air entry bilaterally; [ ] rales [ ] wheezing [ ] rhonchi  Heart: Regular rate and rhythm; [x ] murmur   2/6 [ x] systolic [ ] diastolic [ ] radiation[ ] rubs [ ]  gallops  Abdomen: Nontender, bowel sounds present  Extremities: No clubbing, cyanosis, [ ] edema [ ]Pulses  equal and intact  Nervous system:  Alert & Oriented X3, no focal deficits  Psychiatric: Normal affect  Skin: No rashes or lesions    LABS:  06-22    132[L]  |  104  |  10  ----------------------------<  155[H]  3.5   |  27  |  0.64    Ca    7.7[L]      22 Jun 2025 05:38  Phos  1.6     06-22  Mg     1.3     06-22    TPro  6.2  /  Alb  2.5[L]  /  TBili  0.2  /  DBili  x   /  AST  14  /  ALT  11  /  AlkPhos  54  06-22    Creatinine Trend: 0.64<--, 0.73<--, 0.86<--, 1.03<--                        11.4   12.78 )-----------( 408      ( 22 Jun 2025 05:38 )             32.1

## 2025-06-22 NOTE — DISCHARGE NOTE PROVIDER - PROVIDER TOKENS
PROVIDER:[TOKEN:[[id number]]],PROVIDER:[TOKEN:[[id number]]],PROVIDER:[TOKEN:[[id number]]]

## 2025-06-22 NOTE — DISCHARGE NOTE PROVIDER - CARE PROVIDER_API CALL
Thomas Jackson  Internal Medicine  [address] [place] [address]  Phone: [phone]  Fax: [phone]  Follow Up Time:     Sunny Palacios  Cardiovascular Disease  [address] [place] [address]  Phone: [phone]  Fax: [phone]  Follow Up Time:     Hong Chopra  Obstetrics & Gynecology  [address] [facility] [address]  [place] [address]  Phone: [phone]  Fax: [phone]  Follow Up Time:

## 2025-06-22 NOTE — DISCHARGE NOTE PROVIDER - NSDCCPCAREPLAN_GEN_ALL_CORE_FT
PRINCIPAL DISCHARGE DIAGNOSIS  Diagnosis: Pyelonephritis  Assessment and Plan of Treatment: Continue taking your antibiotics as prescribed and monitor for signs and symptoms of infection, such as fever or chills, burning/pain with urination, urinary frequency/hesitancy, cloudy or bloody urine. Report any worsening symptoms of back pain or flank pain associated with nausea or vomiting, inability tolerating your antibiotic.         SECONDARY DISCHARGE DIAGNOSES  Diagnosis: Ovarian mass  Assessment and Plan of Treatment: You were found to have a right adnexal mass on your CT scan and an ultrasound was performed which showed ??????????????????????????????????    Diagnosis: Blood loss anemia  Assessment and Plan of Treatment: You were found to be anemic on this admission and received one unit of packed red blood cell which you responded well to.  Make sure to continue to follow up with your doctor as outpatient for routine labs to monitor  your hemoglin.    Symptoms to report, bleeding, palpitations, fatigue, pale skin, cold skin, dizziness. Take medications as ordered by PCP      Diagnosis: Hyperlipidemia  Assessment and Plan of Treatment: Please continue taking your cholesterol medication as prescribed and follow up with your doctor for routine blood work and including evaluation of your liver function while taking medication for your cholesterol.  Report any changes in the color of your skin such as yellowing of your skin, changes in the color of your urine, itching skin, cramps to your lower legs.      Diagnosis: Diabetes  Assessment and Plan of Treatment: HgA1C this admission.  Make sure you get your HgA1c checked every three months.  If you take oral diabetes medications, check your blood glucose two times a day.  If you take insulin, check your blood glucose before meals and at bedtime.  It's important not to skip any meals.  Keep a log of your blood glucose results and always take it with you to your doctor appointments.  Keep a list of your current medications including injectables and over the counter medications and bring this medication list with you to all your doctor appointments.  If you have not seen your ophthalmologist this year call for appointment.  Check your feet daily for redness, sores, or openings. Do not self treat. If no improvement in two days call your primary care physician for an appointment.  Low blood sugar (hypoglycemia) is a blood sugar below 70mg/dl. Check your blood sugar if you feel signs/symptoms of hypoglycemia. If your blood sugar is below 70 take 15 grams of carbohydrates (ex 4 oz of apple juice, 3-4 glucose tablets, or 4-6 oz of regular soda) wait 15 minutes and repeat blood sugar to make sure it comes up above 70.  If your blood sugar is above 70 and you are due for a meal, have a meal.  If you are not due for a meal have a snack.  This snack helps keeps your blood sugar at a safe range.      Diagnosis: Hypothyroidism  Assessment and Plan of Treatment: you do not make enough thyroid hormone  signs & symptoms of low levels of thyroid hormone - tired, getting cold easily, coarse or thin hair, constipation, shortness of breath, swelling, irregular periods  your doctor will do thyroid hormone blood tests at least once a year to monitor if medication dose is adequate  take your thyroid medicine as directed by your doctor & on empty stomach      Diagnosis: Hypertension  Assessment and Plan of Treatment: You are being treated for high blood pressure.  Continue taking your medication as prescribed to help prevent or minimize your risk of end organ damage.  Follow up with your doctor for routine blood pressure evaluation and medication regimen.  Report any symptoms of headaces with nausea or vomiting, visual changes, heart palpitation, chest pain or shortness.       PRINCIPAL DISCHARGE DIAGNOSIS  Diagnosis: Pyelonephritis  Assessment and Plan of Treatment: Continue taking your antibiotics as prescribed and monitor for signs and symptoms of infection, such as fever or chills, burning/pain with urination, urinary frequency/hesitancy, cloudy or bloody urine. Report any worsening symptoms of back pain or flank pain associated with nausea or vomiting, inability tolerating your antibiotic.         SECONDARY DISCHARGE DIAGNOSES  Diagnosis: Ovarian mass  Assessment and Plan of Treatment: You were found to have a right adnexal mass on your CT scan and an ultrasound was performed which showed ??????????????????????????????????    Diagnosis: Blood loss anemia  Assessment and Plan of Treatment: You were found to be anemic on this admission and received one unit of packed red blood cell which you responded well to.  Make sure to continue to follow up with your doctor as outpatient for routine labs to monitor  your hemoglin.    Symptoms to report, bleeding, palpitations, fatigue, pale skin, cold skin, dizziness. Take medications as ordered by PCP      Diagnosis: Hyperlipidemia  Assessment and Plan of Treatment: Please continue taking your cholesterol medication as prescribed and follow up with your doctor for routine blood work and including evaluation of your liver function while taking medication for your cholesterol.  Report any changes in the color of your skin such as yellowing of your skin, changes in the color of your urine, itching skin, cramps to your lower legs.      Diagnosis: Diabetes  Assessment and Plan of Treatment: HgA1C this admission was 6.5.  Make sure you get your HgA1c checked every three months.  If you take oral diabetes medications, check your blood glucose two times a day.  If you take insulin, check your blood glucose before meals and at bedtime.  It's important not to skip any meals.  Keep a log of your blood glucose results and always take it with you to your doctor appointments.  Keep a list of your current medications including injectables and over the counter medications and bring this medication list with you to all your doctor appointments.  If you have not seen your ophthalmologist this year call for appointment.  Check your feet daily for redness, sores, or openings. Do not self treat. If no improvement in two days call your primary care physician for an appointment.  Low blood sugar (hypoglycemia) is a blood sugar below 70mg/dl. Check your blood sugar if you feel signs/symptoms of hypoglycemia. If your blood sugar is below 70 take 15 grams of carbohydrates (ex 4 oz of apple juice, 3-4 glucose tablets, or 4-6 oz of regular soda) wait 15 minutes and repeat blood sugar to make sure it comes up above 70.  If your blood sugar is above 70 and you are due for a meal, have a meal.  If you are not due for a meal have a snack.  This snack helps keeps your blood sugar at a safe range.      Diagnosis: Hypothyroidism  Assessment and Plan of Treatment: you do not make enough thyroid hormone  signs & symptoms of low levels of thyroid hormone - tired, getting cold easily, coarse or thin hair, constipation, shortness of breath, swelling, irregular periods  your doctor will do thyroid hormone blood tests at least once a year to monitor if medication dose is adequate  take your thyroid medicine as directed by your doctor & on empty stomach      Diagnosis: Hypertension  Assessment and Plan of Treatment: You are being treated for high blood pressure.  Continue taking your medication as prescribed to help prevent or minimize your risk of end organ damage.  Follow up with your doctor for routine blood pressure evaluation and medication regimen.  Report any symptoms of headaces with nausea or vomiting, visual changes, heart palpitation, chest pain or shortness.       PRINCIPAL DISCHARGE DIAGNOSIS  Diagnosis: Pyelonephritis  Assessment and Plan of Treatment: CT scan showed possible inflammation in the kidneys concerning for infection.  Urinalysis did not show signs of a urine infection, however you were started on antibiotics as a precaution and recommended to take 5 more days of antibiotics.   Continue taking your antibiotics as prescribed and monitor for signs and symptoms of infection, such as fever or chills, burning/pain with urination, urinary frequency/hesitancy, cloudy or bloody urine. Report any worsening symptoms of back pain or flank pain associated with nausea or vomiting, inability tolerating your antibiotic.         SECONDARY DISCHARGE DIAGNOSES  Diagnosis: Elevated troponin  Assessment and Plan of Treatment: You had elevated blood troponin levels.  Cardiology    Diagnosis: Ovarian mass  Assessment and Plan of Treatment: You were found to have a possible right adnexal mass on your CT scan. OB/GYN was consulted and they recommended for a transvaginal ulltrasound.  This was done and showed no issues with your ovaries.  No further work-up was recommended. Follow-up with your PCP.    Diagnosis: Blood loss anemia  Assessment and Plan of Treatment: Your blood count was found to be very low on admission at 6.6.  CT scan of the abdomen did not show any signs of bleeding, out of precaution you were given 1 unit of blood. Repeat bloodwork showed your blood count was much higher than expected.  You did not show any signs of bleeding while you were in the hospital.  You should follow-up with a Gastrointestional specialist for further work-up and screening for bleeding.  Make sure to continue to follow up with your doctor as outpatient for routine labs to monitor  your hemoglin.    Symptoms to report, bleeding, palpitations, fatigue, pale skin, cold skin, dizziness. Take medications as ordered by PCP      Diagnosis: Diabetes  Assessment and Plan of Treatment: HgA1C this admission was 6.5.  Make sure you get your HgA1c checked every three months.  If you take oral diabetes medications, check your blood glucose two times a day.  If you take insulin, check your blood glucose before meals and at bedtime.  It's important not to skip any meals.  Keep a log of your blood glucose results and always take it with you to your doctor appointments.  Keep a list of your current medications including injectables and over the counter medications and bring this medication list with you to all your doctor appointments.  If you have not seen your ophthalmologist this year call for appointment.  Check your feet daily for redness, sores, or openings. Do not self treat. If no improvement in two days call your primary care physician for an appointment.  Low blood sugar (hypoglycemia) is a blood sugar below 70mg/dl. Check your blood sugar if you feel signs/symptoms of hypoglycemia. If your blood sugar is below 70 take 15 grams of carbohydrates (ex 4 oz of apple juice, 3-4 glucose tablets, or 4-6 oz of regular soda) wait 15 minutes and repeat blood sugar to make sure it comes up above 70.  If your blood sugar is above 70 and you are due for a meal, have a meal.  If you are not due for a meal have a snack.  This snack helps keeps your blood sugar at a safe range.      Diagnosis: Hypertension  Assessment and Plan of Treatment: You are being treated for high blood pressure.  Continue taking your medication as prescribed to help prevent or minimize your risk of end organ damage.  Follow up with your doctor for routine blood pressure evaluation and medication regimen.  Report any symptoms of headaces with nausea or vomiting, visual changes, heart palpitation, chest pain or shortness.      Diagnosis: Hypothyroidism  Assessment and Plan of Treatment: you do not make enough thyroid hormone  signs & symptoms of low levels of thyroid hormone - tired, getting cold easily, coarse or thin hair, constipation, shortness of breath, swelling, irregular periods  your doctor will do thyroid hormone blood tests at least once a year to monitor if medication dose is adequate  take your thyroid medicine as directed by your doctor & on empty stomach      Diagnosis: Hyperlipidemia  Assessment and Plan of Treatment: Please continue taking your cholesterol medication as prescribed and follow up with your doctor for routine blood work and including evaluation of your liver function while taking medication for your cholesterol.  Report any changes in the color of your skin such as yellowing of your skin, changes in the color of your urine, itching skin, cramps to your lower legs.       PRINCIPAL DISCHARGE DIAGNOSIS  Diagnosis: Pyelonephritis  Assessment and Plan of Treatment: CT scan showed possible inflammation in the kidneys concerning for infection.  Urinalysis did not show signs of a urine infection, however you were started on antibiotics as a precaution and recommended to take 5 more days of antibiotics.   Continue taking your antibiotics as prescribed and monitor for signs and symptoms of infection, such as fever or chills, burning/pain with urination, urinary frequency/hesitancy, cloudy or bloody urine. Report any worsening symptoms of back pain or flank pain associated with nausea or vomiting, inability tolerating your antibiotic.         SECONDARY DISCHARGE DIAGNOSES  Diagnosis: Elevated troponin  Assessment and Plan of Treatment: You had elevated blood troponin levels concerning for heart blockages.  Cardiology was consulted, echo was done showing normal heart pumping function and stress test was done.  Stress test was negative to suggest any heart blockages.  No further work-up is recommended at this time.    Diagnosis: Ovarian mass  Assessment and Plan of Treatment: You were found to have a possible right adnexal mass on your CT scan. OB/GYN was consulted and they recommended for a transvaginal ulltrasound.  This was done and showed no issues with your ovaries.  No further work-up was recommended. Follow-up with your PCP.    Diagnosis: Blood loss anemia  Assessment and Plan of Treatment: Your blood count was found to be very low on admission at 6.6.  CT scan of the abdomen did not show any signs of bleeding, out of precaution you were given 1 unit of blood. Repeat bloodwork showed your blood count was much higher than expected.  You did not show any signs of bleeding while you were in the hospital.  You should follow-up with a Gastrointestional specialist for further work-up and screening for bleeding.  Make sure to continue to follow up with your doctor as outpatient for routine labs to monitor  your hemoglin.    Symptoms to report, bleeding, palpitations, fatigue, pale skin, cold skin, dizziness. Take medications as ordered by PCP      Diagnosis: Diabetes  Assessment and Plan of Treatment: HgA1C this admission was 6.5.  Make sure you get your HgA1c checked every three months.  If you take oral diabetes medications, check your blood glucose two times a day.  If you take insulin, check your blood glucose before meals and at bedtime.  It's important not to skip any meals.  Keep a log of your blood glucose results and always take it with you to your doctor appointments.  Keep a list of your current medications including injectables and over the counter medications and bring this medication list with you to all your doctor appointments.  If you have not seen your ophthalmologist this year call for appointment.  Check your feet daily for redness, sores, or openings. Do not self treat. If no improvement in two days call your primary care physician for an appointment.  Low blood sugar (hypoglycemia) is a blood sugar below 70mg/dl. Check your blood sugar if you feel signs/symptoms of hypoglycemia. If your blood sugar is below 70 take 15 grams of carbohydrates (ex 4 oz of apple juice, 3-4 glucose tablets, or 4-6 oz of regular soda) wait 15 minutes and repeat blood sugar to make sure it comes up above 70.  If your blood sugar is above 70 and you are due for a meal, have a meal.  If you are not due for a meal have a snack.  This snack helps keeps your blood sugar at a safe range.      Diagnosis: Hypertension  Assessment and Plan of Treatment: You are being treated for high blood pressure.  Continue taking your medication as prescribed to help prevent or minimize your risk of end organ damage.  Follow up with your doctor for routine blood pressure evaluation and medication regimen.  Report any symptoms of headaces with nausea or vomiting, visual changes, heart palpitation, chest pain or shortness.      Diagnosis: Hypothyroidism  Assessment and Plan of Treatment: you do not make enough thyroid hormone  signs & symptoms of low levels of thyroid hormone - tired, getting cold easily, coarse or thin hair, constipation, shortness of breath, swelling, irregular periods  your doctor will do thyroid hormone blood tests at least once a year to monitor if medication dose is adequate  take your thyroid medicine as directed by your doctor & on empty stomach      Diagnosis: Hyperlipidemia  Assessment and Plan of Treatment: Please continue taking your cholesterol medication as prescribed and follow up with your doctor for routine blood work and including evaluation of your liver function while taking medication for your cholesterol.  Report any changes in the color of your skin such as yellowing of your skin, changes in the color of your urine, itching skin, cramps to your lower legs.

## 2025-06-23 ENCOUNTER — RESULT REVIEW (OUTPATIENT)
Age: 85
End: 2025-06-23

## 2025-06-23 DIAGNOSIS — I48.91 UNSPECIFIED ATRIAL FIBRILLATION: ICD-10-CM

## 2025-06-23 LAB
ALBUMIN SERPL ELPH-MCNC: 2.4 G/DL — LOW (ref 3.5–5)
ALP SERPL-CCNC: 48 U/L — SIGNIFICANT CHANGE UP (ref 40–120)
ALT FLD-CCNC: 13 U/L DA — SIGNIFICANT CHANGE UP (ref 10–60)
ANION GAP SERPL CALC-SCNC: 4 MMOL/L — LOW (ref 5–17)
AST SERPL-CCNC: 16 U/L — SIGNIFICANT CHANGE UP (ref 10–40)
BASOPHILS # BLD AUTO: 0.04 K/UL — SIGNIFICANT CHANGE UP (ref 0–0.2)
BASOPHILS NFR BLD AUTO: 0.4 % — SIGNIFICANT CHANGE UP (ref 0–2)
BILIRUB SERPL-MCNC: 0.2 MG/DL — SIGNIFICANT CHANGE UP (ref 0.2–1.2)
BUN SERPL-MCNC: 8 MG/DL — SIGNIFICANT CHANGE UP (ref 7–18)
CALCIUM SERPL-MCNC: 8 MG/DL — LOW (ref 8.4–10.5)
CHLORIDE SERPL-SCNC: 104 MMOL/L — SIGNIFICANT CHANGE UP (ref 96–108)
CO2 SERPL-SCNC: 26 MMOL/L — SIGNIFICANT CHANGE UP (ref 22–31)
CREAT SERPL-MCNC: 0.62 MG/DL — SIGNIFICANT CHANGE UP (ref 0.5–1.3)
EGFR: 88 ML/MIN/1.73M2 — SIGNIFICANT CHANGE UP
EGFR: 88 ML/MIN/1.73M2 — SIGNIFICANT CHANGE UP
EOSINOPHIL # BLD AUTO: 0.32 K/UL — SIGNIFICANT CHANGE UP (ref 0–0.5)
EOSINOPHIL NFR BLD AUTO: 2.9 % — SIGNIFICANT CHANGE UP (ref 0–6)
GLUCOSE BLDC GLUCOMTR-MCNC: 134 MG/DL — HIGH (ref 70–99)
GLUCOSE BLDC GLUCOMTR-MCNC: 163 MG/DL — HIGH (ref 70–99)
GLUCOSE BLDC GLUCOMTR-MCNC: 263 MG/DL — HIGH (ref 70–99)
GLUCOSE BLDC GLUCOMTR-MCNC: 98 MG/DL — SIGNIFICANT CHANGE UP (ref 70–99)
GLUCOSE SERPL-MCNC: 135 MG/DL — HIGH (ref 70–99)
HCT VFR BLD CALC: 31 % — LOW (ref 34.5–45)
HGB BLD-MCNC: 10.8 G/DL — LOW (ref 11.5–15.5)
IMM GRANULOCYTES NFR BLD AUTO: 0.5 % — SIGNIFICANT CHANGE UP (ref 0–0.9)
LYMPHOCYTES # BLD AUTO: 18.9 % — SIGNIFICANT CHANGE UP (ref 13–44)
LYMPHOCYTES # BLD AUTO: 2.07 K/UL — SIGNIFICANT CHANGE UP (ref 1–3.3)
MAGNESIUM SERPL-MCNC: 2.1 MG/DL — SIGNIFICANT CHANGE UP (ref 1.6–2.6)
MCHC RBC-ENTMCNC: 29.1 PG — SIGNIFICANT CHANGE UP (ref 27–34)
MCHC RBC-ENTMCNC: 34.8 G/DL — SIGNIFICANT CHANGE UP (ref 32–36)
MCV RBC AUTO: 83.6 FL — SIGNIFICANT CHANGE UP (ref 80–100)
MONOCYTES # BLD AUTO: 0.65 K/UL — SIGNIFICANT CHANGE UP (ref 0–0.9)
MONOCYTES NFR BLD AUTO: 5.9 % — SIGNIFICANT CHANGE UP (ref 2–14)
NEUTROPHILS # BLD AUTO: 7.83 K/UL — HIGH (ref 1.8–7.4)
NEUTROPHILS NFR BLD AUTO: 71.4 % — SIGNIFICANT CHANGE UP (ref 43–77)
NRBC BLD AUTO-RTO: 0 /100 WBCS — SIGNIFICANT CHANGE UP (ref 0–0)
PHOSPHATE SERPL-MCNC: 1.9 MG/DL — LOW (ref 2.5–4.5)
PLATELET # BLD AUTO: 403 K/UL — HIGH (ref 150–400)
POTASSIUM SERPL-MCNC: 3.5 MMOL/L — SIGNIFICANT CHANGE UP (ref 3.5–5.3)
POTASSIUM SERPL-SCNC: 3.5 MMOL/L — SIGNIFICANT CHANGE UP (ref 3.5–5.3)
PROT SERPL-MCNC: 5.8 G/DL — LOW (ref 6–8.3)
RBC # BLD: 3.71 M/UL — LOW (ref 3.8–5.2)
RBC # FLD: 14.6 % — HIGH (ref 10.3–14.5)
SODIUM SERPL-SCNC: 134 MMOL/L — LOW (ref 135–145)
WBC # BLD: 10.97 K/UL — HIGH (ref 3.8–10.5)
WBC # FLD AUTO: 10.97 K/UL — HIGH (ref 3.8–10.5)

## 2025-06-23 PROCEDURE — 76830 TRANSVAGINAL US NON-OB: CPT | Mod: 26

## 2025-06-23 PROCEDURE — 76856 US EXAM PELVIC COMPLETE: CPT | Mod: 26,59

## 2025-06-23 RX ORDER — SOD PHOS DI, MONO/K PHOS MONO 250 MG
1 TABLET ORAL THREE TIMES A DAY
Refills: 0 | Status: DISCONTINUED | OUTPATIENT
Start: 2025-06-23 | End: 2025-06-24

## 2025-06-23 RX ADMIN — CEFTRIAXONE 100 MILLIGRAM(S): 500 INJECTION, POWDER, FOR SOLUTION INTRAMUSCULAR; INTRAVENOUS at 02:35

## 2025-06-23 RX ADMIN — AMLODIPINE BESYLATE 5 MILLIGRAM(S): 10 TABLET ORAL at 05:46

## 2025-06-23 RX ADMIN — LOSARTAN POTASSIUM 100 MILLIGRAM(S): 100 TABLET, FILM COATED ORAL at 05:46

## 2025-06-23 RX ADMIN — Medication 81 MILLIGRAM(S): at 11:17

## 2025-06-23 RX ADMIN — LIDOCAINE HYDROCHLORIDE 1 PATCH: 20 JELLY TOPICAL at 11:16

## 2025-06-23 RX ADMIN — Medication 25 MICROGRAM(S): at 05:46

## 2025-06-23 RX ADMIN — LIDOCAINE HYDROCHLORIDE 1 PATCH: 20 JELLY TOPICAL at 00:17

## 2025-06-23 RX ADMIN — INSULIN LISPRO 3: 100 INJECTION, SOLUTION INTRAVENOUS; SUBCUTANEOUS at 17:09

## 2025-06-23 RX ADMIN — Medication 1 PACKET(S): at 14:34

## 2025-06-23 RX ADMIN — Medication 1 PACKET(S): at 21:10

## 2025-06-23 RX ADMIN — LIDOCAINE HYDROCHLORIDE 1 PATCH: 20 JELLY TOPICAL at 18:37

## 2025-06-23 NOTE — PROGRESS NOTE ADULT - TIME BILLING
- Review of records, telemetry, vital signs and daily labs.   - General and cardiovascular physical examination.  - Generation of cardiovascular treatment plan and completion of note .  - Coordination of care-discussed with ACP, and  on disposition plan .      Patient was seen and examined by me on 6/22/25 ,interim events noted,labs and radiology studies reviewed.  Sunny Palacios MD,FACC.  [address] [place], [place] [id number].  [phone]  Availabe to call or text on [employer] TEAMS.
- Review of records, telemetry, vital signs and daily labs.   - General and cardiovascular physical examination.  - Generation of cardiovascular treatment plan and completion of note .  - Coordination of care-discussed with ACP, and  on disposition plan .      Patient was seen and examined by me on 6/23/25 ,interim events noted,labs and radiology studies reviewed.  Sunny Palacios MD,FACC.  [address] [place], [place] [id number].  [phone]  Availabe to call or text on [employer] TEAMS.
- Review of records, telemetry, vital signs and daily labs.   - General and cardiovascular physical examination.  - Generation of cardiovascular treatment plan and completion of note .  - Coordination of care-discussed with ACP, and  on disposition plan .      Patient was seen and examined by me on 6/21/25 ,interim events noted,labs and radiology studies reviewed.  Sunny Palacios MD,FACC.  [address] [place], [place] [id number].  [phone]  Availabe to call or text on [employer] TEAMS.

## 2025-06-23 NOTE — PROGRESS NOTE ADULT - PROBLEM SELECTOR PLAN 6
193/96 on admission > 162/94  c/w home losartan, amlodipine w holding parameters c/w home losartan, amlodipine w holding parameters

## 2025-06-23 NOTE — PROGRESS NOTE ADULT - SUBJECTIVE AND OBJECTIVE BOX
NP Note discussed with  primary attending    Patient is a 85y old  Female who presents with a chief complaint of nausea/vomiting with new ovarian mass (22 Jun 2025 14:30)      INTERVAL HPI/OVERNIGHT EVENTS: no new complaints    MEDICATIONS  (STANDING):  amLODIPine   Tablet 5 milliGRAM(s) Oral daily  aspirin enteric coated 81 milliGRAM(s) Oral daily  cefTRIAXone   IVPB 2000 milliGRAM(s) IV Intermittent every 24 hours  insulin lispro (ADMELOG) corrective regimen sliding scale   SubCutaneous three times a day before meals  insulin lispro (ADMELOG) corrective regimen sliding scale   SubCutaneous at bedtime  levothyroxine 25 MICROGram(s) Oral daily  lidocaine   4% Patch 1 Patch Transdermal daily  losartan 100 milliGRAM(s) Oral daily  naloxone Injectable 0.4 milliGRAM(s) IV Push once  polyethylene glycol 3350 17 Gram(s) Oral daily  potassium phosphate / sodium phosphate Powder (PHOS-NaK) 1 Packet(s) Oral three times a day  senna 2 Tablet(s) Oral at bedtime    MEDICATIONS  (PRN):  aluminum hydroxide/magnesium hydroxide/simethicone Suspension 30 milliLiter(s) Oral every 4 hours PRN Dyspepsia  bisacodyl 5 milliGRAM(s) Oral daily PRN Constipation  melatonin 3 milliGRAM(s) Oral at bedtime PRN Insomnia  ondansetron Injectable 4 milliGRAM(s) IV Push every 8 hours PRN Nausea and/or Vomiting  oxyCODONE    IR 2.5 milliGRAM(s) Oral every 4 hours PRN Moderate Pain (4 - 6)  oxyCODONE    IR 5 milliGRAM(s) Oral every 4 hours PRN Severe Pain (7 - 10)      __________________________________________________  REVIEW OF SYSTEMS:    CONSTITUTIONAL: No fever,   EYES: no acute visual disturbances  NECK: No pain or stiffness  RESPIRATORY: No cough; No shortness of breath  CARDIOVASCULAR: No chest pain, no palpitations  GASTROINTESTINAL: No pain. No nausea or vomiting; No diarrhea   NEUROLOGICAL: No headache or numbness, no tremors  MUSCULOSKELETAL: No joint pain, no muscle pain  GENITOURINARY: no dysuria, no frequency, no hesitancy  PSYCHIATRY: no depression , no anxiety  ALL OTHER  ROS negative        Vital Signs Last 24 Hrs  T(C): 36.8 (23 Jun 2025 11:01), Max: 37.1 (22 Jun 2025 16:25)  T(F): 98.2 (23 Jun 2025 11:01), Max: 98.8 (22 Jun 2025 16:25)  HR: 80 (23 Jun 2025 11:01) (80 - 94)  BP: 153/75 (23 Jun 2025 11:01) (135/70 - 167/72)  BP(mean): 91 (23 Jun 2025 05:03) (91 - 100)  RR: 18 (23 Jun 2025 11:01) (18 - 18)  SpO2: 97% (23 Jun 2025 11:01) (93% - 97%)    Parameters below as of 23 Jun 2025 11:01  Patient On (Oxygen Delivery Method): room air        ________________________________________________  PHYSICAL EXAM:  GENERAL: NAD  HEENT: Normocephalic;  conjunctivae and sclerae clear; moist mucous membranes;   NECK : supple  CHEST/LUNG: Clear to ausculitation bilaterally with good air entry   HEART: S1 S2  regular; no murmurs, gallops or rubs  ABDOMEN: Soft, Nontender, Nondistended; Bowel sounds present  EXTREMITIES: no cyanosis; no edema; no calf tenderness  SKIN: warm and dry; no rash  NERVOUS SYSTEM:  Awake and alert; Oriented  to place, person and time ; no new deficits    _________________________________________________  LABS:                        10.8   10.97 )-----------( 403      ( 23 Jun 2025 07:24 )             31.0     06-23    134[L]  |  104  |  8   ----------------------------<  135[H]  3.5   |  26  |  0.62    Ca    8.0[L]      23 Jun 2025 07:24  Phos  1.9     06-23  Mg     2.1     06-23    TPro  5.8[L]  /  Alb  2.4[L]  /  TBili  0.2  /  DBili  x   /  AST  16  /  ALT  13  /  AlkPhos  48  06-23      Urinalysis Basic - ( 23 Jun 2025 07:24 )    Color: x / Appearance: x / SG: x / pH: x  Gluc: 135 mg/dL / Ketone: x  / Bili: x / Urobili: x   Blood: x / Protein: x / Nitrite: x   Leuk Esterase: x / RBC: x / WBC x   Sq Epi: x / Non Sq Epi: x / Bacteria: x      CAPILLARY BLOOD GLUCOSE      POCT Blood Glucose.: 163 mg/dL (23 Jun 2025 11:21)  POCT Blood Glucose.: 134 mg/dL (23 Jun 2025 07:23)  POCT Blood Glucose.: 152 mg/dL (22 Jun 2025 21:22)  POCT Blood Glucose.: 97 mg/dL (22 Jun 2025 16:50)        RADIOLOGY & ADDITIONAL TESTS:    Imaging Personally Reviewed:  YES/NO    Consultant(s) Notes Reviewed:   YES/ No    Care Discussed with Consultants :     Plan of care was discussed with patient and /or primary care giver; all questions and concerns were addressed and care was aligned with patient's wishes.     NP Note discussed with  primary attending    Patient is a 85y old  Female who presents with a chief complaint of nausea/vomiting with new ovarian mass (22 Jun 2025 14:30)      INTERVAL HPI/OVERNIGHT EVENTS: no new complaints    MEDICATIONS  (STANDING):  amLODIPine   Tablet 5 milliGRAM(s) Oral daily  aspirin enteric coated 81 milliGRAM(s) Oral daily  cefTRIAXone   IVPB 2000 milliGRAM(s) IV Intermittent every 24 hours  insulin lispro (ADMELOG) corrective regimen sliding scale   SubCutaneous three times a day before meals  insulin lispro (ADMELOG) corrective regimen sliding scale   SubCutaneous at bedtime  levothyroxine 25 MICROGram(s) Oral daily  lidocaine   4% Patch 1 Patch Transdermal daily  losartan 100 milliGRAM(s) Oral daily  naloxone Injectable 0.4 milliGRAM(s) IV Push once  polyethylene glycol 3350 17 Gram(s) Oral daily  potassium phosphate / sodium phosphate Powder (PHOS-NaK) 1 Packet(s) Oral three times a day  senna 2 Tablet(s) Oral at bedtime    MEDICATIONS  (PRN):  aluminum hydroxide/magnesium hydroxide/simethicone Suspension 30 milliLiter(s) Oral every 4 hours PRN Dyspepsia  bisacodyl 5 milliGRAM(s) Oral daily PRN Constipation  melatonin 3 milliGRAM(s) Oral at bedtime PRN Insomnia  ondansetron Injectable 4 milliGRAM(s) IV Push every 8 hours PRN Nausea and/or Vomiting  oxyCODONE    IR 2.5 milliGRAM(s) Oral every 4 hours PRN Moderate Pain (4 - 6)  oxyCODONE    IR 5 milliGRAM(s) Oral every 4 hours PRN Severe Pain (7 - 10)      __________________________________________________  REVIEW OF SYSTEMS:    CONSTITUTIONAL: No fever,   RESPIRATORY: No cough; No shortness of breath  CARDIOVASCULAR: No chest pain, no palpitations  GASTROINTESTINAL: No pain. No nausea or vomiting; No diarrhea   NEUROLOGICAL: No headache or numbness, no tremors  MUSCULOSKELETAL: No joint pain, no muscle pain  GENITOURINARY: no dysuria, no frequency, no hesitancy        Vital Signs Last 24 Hrs  T(C): 36.8 (23 Jun 2025 11:01), Max: 37.1 (22 Jun 2025 16:25)  T(F): 98.2 (23 Jun 2025 11:01), Max: 98.8 (22 Jun 2025 16:25)  HR: 80 (23 Jun 2025 11:01) (80 - 94)  BP: 153/75 (23 Jun 2025 11:01) (135/70 - 167/72)  BP(mean): 91 (23 Jun 2025 05:03) (91 - 100)  RR: 18 (23 Jun 2025 11:01) (18 - 18)  SpO2: 97% (23 Jun 2025 11:01) (93% - 97%)    Parameters below as of 23 Jun 2025 11:01  Patient On (Oxygen Delivery Method): room air        ________________________________________________  PHYSICAL EXAM:  GENERAL: NAD  CHEST/LUNG: Clear to ausculitation bilaterally  HEART: S1 S2  regular;   ABDOMEN: Soft, Nontender,   EXTREMITIES: no cyanosis; no edema; no calf tenderness  SKIN: warm and dry; no rash  NERVOUS SYSTEM:  Awake and alert; Oriented  to place, person and time ; no new deficits    _________________________________________________  LABS:                        10.8   10.97 )-----------( 403      ( 23 Jun 2025 07:24 )             31.0     06-23    134[L]  |  104  |  8   ----------------------------<  135[H]  3.5   |  26  |  0.62    Ca    8.0[L]      23 Jun 2025 07:24  Phos  1.9     06-23  Mg     2.1     06-23    TPro  5.8[L]  /  Alb  2.4[L]  /  TBili  0.2  /  DBili  x   /  AST  16  /  ALT  13  /  AlkPhos  48  06-23      Urinalysis Basic - ( 23 Jun 2025 07:24 )    Color: x / Appearance: x / SG: x / pH: x  Gluc: 135 mg/dL / Ketone: x  / Bili: x / Urobili: x   Blood: x / Protein: x / Nitrite: x   Leuk Esterase: x / RBC: x / WBC x   Sq Epi: x / Non Sq Epi: x / Bacteria: x      CAPILLARY BLOOD GLUCOSE      POCT Blood Glucose.: 163 mg/dL (23 Jun 2025 11:21)  POCT Blood Glucose.: 134 mg/dL (23 Jun 2025 07:23)  POCT Blood Glucose.: 152 mg/dL (22 Jun 2025 21:22)  POCT Blood Glucose.: 97 mg/dL (22 Jun 2025 16:50)        RADIOLOGY & ADDITIONAL TESTS:    Imaging Personally Reviewed:  YES/NO    Consultant(s) Notes Reviewed:   YES/ No    Care Discussed with Consultants :     Plan of care was discussed with patient and /or primary care giver; all questions and concerns were addressed and care was aligned with patient's wishes.

## 2025-06-23 NOTE — PROGRESS NOTE ADULT - PROBLEM SELECTOR PLAN 4
noted with abnormal EKG with Afib w/ RVR, 1st degree AV block.   s/p ASA 325mg and Metoprolol 12.5mg   now rate controlled   Cardiology following.   Trop trended down to 1524.     continue Telemetry  f/u Echo, Stress

## 2025-06-23 NOTE — PROGRESS NOTE ADULT - NUTRITIONAL ASSESSMENT
bnormal EKG with Afib w/ RVR, 1st degree AV block. s/p ASA 325mg and Metoprolol 12.5mg x 1 dose overnight. Cardiology following. Trop trended down to 1524.   -continue Telemetry  -f/u Echo, STRESS test per cardiology dr. Palacios

## 2025-06-23 NOTE — PROGRESS NOTE ADULT - PROBLEM SELECTOR PLAN 4
noted with abnormal EKG with Afib w/ RVR, 1st degree AV block.   s/p ASA 325mg and Metoprolol 12.5mg   Cardiology following.   Trop trended down to 1524.     continue Telemetry  f/u Echo, Stress noted with abnormal EKG with Afib w/ RVR, 1st degree AV block.   s/p ASA 325mg and Metoprolol 12.5mg   now rate controlled   Cardiology following.   Trop trended down to 1524.     continue Telemetry  f/u Echo, Stress

## 2025-06-23 NOTE — PROGRESS NOTE ADULT - SUBJECTIVE AND OBJECTIVE BOX
MR#[id number]  PATIENT NAME:SHERLY CARLSON    DATE OF SERVICE: 06-23-25 @09:08  Patient was seen and examined by Sunny Palacios MD on    06-23-25 @ 09:08 .  Interim events noted.Consultant notes ,Labs,Telemetry reviewed by me       HOSPITAL COURSE: HPI:  85F from home, [facility] NIDDM, HTN, HLD, hypothyroidism, past surgical history of appendectomy and hysterectomy, presenting with 3 weeks of nonbloody diarrhea and vomiting all night prior to admission. She reports okay PO intake until two days ago and has lost 15 lbs in about 5 weeks. On exam she appears well but complains of severe lower abdominal pain and bilateral flank pain. Imaging suggests bilateral pyelonephritis and new R adnexal mass. She will be admitted for anemia requiring transfusion, antibiotic management of pyelo, and gynecologic evaluation.     Note: daughter Tyler ([phone]) and son Rancho ([phone]) are both MDs. Spoke with Rancho on the phone and he confirmed he will be here later today. Confirmed patient is currently FULL CODE.     ED Course    Vitals: BPmax 193/96  Labs: glu 239; lact 2.6>2.5; vbc co2 38; bicarb 20; trop 202; wbc 11; hb 6.6; UA neg   Intervention: 1uprbc; zofran, pepcid, ctx   Consults: gyn  Images:    CTAP     IMPRESSION:  1. Moderate perivesicular stranding, compatible with cystitis. Mild bilateral pyelonephritis.  2. Prominence of the right adnexa which may represent an ovarian mass. Correlate with pelvic ultrasound.     (20 Jun 2025 13:34)      INTERIM EVENTS:Patient seen at bedside ,interim events noted.      [facility] -reviewed admission note, no change since admission  HEART FAILURE: Acute[ ]Chronic[ ] Systolic[ ] Diastolic[ ] Combined Systolic and Diastolic[ ]  CAD[ ] CABG[ ] PCI[ ]  DEVICES[ ] PPM[ ] ICD[ ] ILR[ ]  ATRIAL FIBRILLATION[ ] Paroxysmal[ ] Permanent[ ] CHADS2-[  ]  BLAKE[ ] CKD1[ ] CKD2[ ] CKD3[ ] CKD4[ ] ESRD[ ]  COPD[ ] HTN[ ]   DM[ ] Type1[ ] Type 2[ ]   CVA[ ] Paresis[ ]    AMBULATION: Assisted[ ] Cane/walker[ ] Independent[ ]    MEDICATIONS  (STANDING):  amLODIPine   Tablet 5 milliGRAM(s) Oral daily  aspirin enteric coated 81 milliGRAM(s) Oral daily  cefTRIAXone   IVPB 2000 milliGRAM(s) IV Intermittent every 24 hours  insulin lispro (ADMELOG) corrective regimen sliding scale   SubCutaneous three times a day before meals  insulin lispro (ADMELOG) corrective regimen sliding scale   SubCutaneous at bedtime  levothyroxine 25 MICROGram(s) Oral daily  lidocaine   4% Patch 1 Patch Transdermal daily  losartan 100 milliGRAM(s) Oral daily  naloxone Injectable 0.4 milliGRAM(s) IV Push once  polyethylene glycol 3350 17 Gram(s) Oral daily  potassium phosphate / sodium phosphate Powder (PHOS-NaK) 1 Packet(s) Oral three times a day  senna 2 Tablet(s) Oral at bedtime    MEDICATIONS  (PRN):  aluminum hydroxide/magnesium hydroxide/simethicone Suspension 30 milliLiter(s) Oral every 4 hours PRN Dyspepsia  bisacodyl 5 milliGRAM(s) Oral daily PRN Constipation  melatonin 3 milliGRAM(s) Oral at bedtime PRN Insomnia  ondansetron Injectable 4 milliGRAM(s) IV Push every 8 hours PRN Nausea and/or Vomiting  oxyCODONE    IR 2.5 milliGRAM(s) Oral every 4 hours PRN Moderate Pain (4 - 6)  oxyCODONE    IR 5 milliGRAM(s) Oral every 4 hours PRN Severe Pain (7 - 10)            REVIEW OF SYSTEMS:  Constitutional: [ ] fever, [ ]weight loss,  [ ]fatigue [ ]weight gain  Eyes: [ ] visual changes  Respiratory: [ ]shortness of breath;  [ ] cough, [ ]wheezing, [ ]chills, [ ]hemoptysis  Cardiovascular: [ ] chest pain, [ ]palpitations, [ ]dizziness,  [ ]leg swelling[ ]orthopnea[ ]PND  Gastrointestinal: [ ] abdominal pain, [ ]nausea, [ ]vomiting,  [ ]diarrhea [ ]Constipation [ ]Melena  Genitourinary: [ ] dysuria, [ ] hematuria [ ]Bee  Neurologic: [ ] headaches [ ] tremors[ ]weakness [ ]Paralysis Right[ ] Left[ ]  Skin: [ ] itching, [ ]burning, [ ] rashes  Endocrine: [ ] heat or cold intolerance  Musculoskeletal: [ ] joint pain or swelling; [ ] muscle, back, or extremity pain  Psychiatric: [ ] depression, [ ]anxiety, [ ]mood swings, or [ ]difficulty sleeping  Hematologic: [ ] easy bruising, [ ] bleeding gums    [ ] All remaining systems negative except as per above.   [ ]Unable to obtain.  [x] No change in ROS since admission      Vital Signs Last 24 Hrs  T(C): 36.8 (23 Jun 2025 19:57), Max: 36.9 (23 Jun 2025 07:23)  T(F): 98.2 (23 Jun 2025 19:57), Max: 98.5 (23 Jun 2025 07:23)  HR: 79 (23 Jun 2025 19:57) (79 - 92)  BP: 150/66 (23 Jun 2025 19:57) (132/64 - 167/72)  BP(mean): 91 (23 Jun 2025 05:03) (91 - 100)  RR: 18 (23 Jun 2025 19:57) (18 - 18)  SpO2: 95% (23 Jun 2025 19:57) (94% - 97%)    Parameters below as of 23 Jun 2025 19:57  Patient On (Oxygen Delivery Method): room air      I&O's Summary    22 Jun 2025 07:01  -  23 Jun 2025 07:00  --------------------------------------------------------  IN: 1332 mL / OUT: 0 mL / NET: 1332 mL    23 Jun 2025 07:01  -  23 Jun 2025 22:08  --------------------------------------------------------  IN: 200 mL / OUT: 0 mL / NET: 200 mL        PHYSICAL EXAM:  General: No acute distress BMI-  HEENT: EOMI, PERRL  Neck: Supple, [ ] JVD  Lungs: Equal air entry bilaterally; [ ] rales [ ] wheezing [ ] rhonchi  Heart: Regular rate and rhythm; [x ] murmur   2/6 [ x] systolic [ ] diastolic [ ] radiation[ ] rubs [ ]  gallops  Abdomen: Nontender, bowel sounds present  Extremities: No clubbing, cyanosis, [ ] edema [ ]Pulses  equal and intact  Nervous system:  Alert & Oriented X3, no focal deficits  Psychiatric: Normal affect  Skin: No rashes or lesions    LABS:  06-23    134[L]  |  104  |  8   ----------------------------<  135[H]  3.5   |  26  |  0.62    Ca    8.0[L]      23 Jun 2025 07:24  Phos  1.9     06-23  Mg     2.1     06-23    TPro  5.8[L]  /  Alb  2.4[L]  /  TBili  0.2  /  DBili  x   /  AST  16  /  ALT  13  /  AlkPhos  48  06-23    Creatinine Trend: 0.62<--, 0.64<--, 0.73<--, 0.86<--, 1.03<--                        10.8   10.97 )-----------( 403      ( 23 Jun 2025 07:24 )             31.0

## 2025-06-24 ENCOUNTER — TRANSCRIPTION ENCOUNTER (OUTPATIENT)
Age: 85
End: 2025-06-24

## 2025-06-24 VITALS
HEART RATE: 78 BPM | TEMPERATURE: 98 F | OXYGEN SATURATION: 98 % | SYSTOLIC BLOOD PRESSURE: 142 MMHG | DIASTOLIC BLOOD PRESSURE: 63 MMHG | RESPIRATION RATE: 19 BRPM

## 2025-06-24 DIAGNOSIS — Z75.8 OTHER PROBLEMS RELATED TO MEDICAL FACILITIES AND OTHER HEALTH CARE: ICD-10-CM

## 2025-06-24 LAB
ANION GAP SERPL CALC-SCNC: 5 MMOL/L — SIGNIFICANT CHANGE UP (ref 5–17)
BUN SERPL-MCNC: 9 MG/DL — SIGNIFICANT CHANGE UP (ref 7–18)
CALCIUM SERPL-MCNC: 7.6 MG/DL — LOW (ref 8.4–10.5)
CHLORIDE SERPL-SCNC: 104 MMOL/L — SIGNIFICANT CHANGE UP (ref 96–108)
CO2 SERPL-SCNC: 25 MMOL/L — SIGNIFICANT CHANGE UP (ref 22–31)
CREAT SERPL-MCNC: 0.61 MG/DL — SIGNIFICANT CHANGE UP (ref 0.5–1.3)
EGFR: 89 ML/MIN/1.73M2 — SIGNIFICANT CHANGE UP
EGFR: 89 ML/MIN/1.73M2 — SIGNIFICANT CHANGE UP
GLUCOSE BLDC GLUCOMTR-MCNC: 123 MG/DL — HIGH (ref 70–99)
GLUCOSE BLDC GLUCOMTR-MCNC: 135 MG/DL — HIGH (ref 70–99)
GLUCOSE BLDC GLUCOMTR-MCNC: 264 MG/DL — HIGH (ref 70–99)
GLUCOSE SERPL-MCNC: 128 MG/DL — HIGH (ref 70–99)
HCT VFR BLD CALC: 31.1 % — LOW (ref 34.5–45)
HGB BLD-MCNC: 10.8 G/DL — LOW (ref 11.5–15.5)
MCHC RBC-ENTMCNC: 29.4 PG — SIGNIFICANT CHANGE UP (ref 27–34)
MCHC RBC-ENTMCNC: 34.7 G/DL — SIGNIFICANT CHANGE UP (ref 32–36)
MCV RBC AUTO: 84.7 FL — SIGNIFICANT CHANGE UP (ref 80–100)
NRBC BLD AUTO-RTO: 0 /100 WBCS — SIGNIFICANT CHANGE UP (ref 0–0)
PHOSPHATE SERPL-MCNC: 2.5 MG/DL — SIGNIFICANT CHANGE UP (ref 2.5–4.5)
PLATELET # BLD AUTO: 449 K/UL — HIGH (ref 150–400)
POTASSIUM SERPL-MCNC: 3.7 MMOL/L — SIGNIFICANT CHANGE UP (ref 3.5–5.3)
POTASSIUM SERPL-SCNC: 3.7 MMOL/L — SIGNIFICANT CHANGE UP (ref 3.5–5.3)
RBC # BLD: 3.67 M/UL — LOW (ref 3.8–5.2)
RBC # FLD: 14.3 % — SIGNIFICANT CHANGE UP (ref 10.3–14.5)
SODIUM SERPL-SCNC: 134 MMOL/L — LOW (ref 135–145)
WBC # BLD: 9.55 K/UL — SIGNIFICANT CHANGE UP (ref 3.8–10.5)
WBC # FLD AUTO: 9.55 K/UL — SIGNIFICANT CHANGE UP (ref 3.8–10.5)

## 2025-06-24 PROCEDURE — 84443 ASSAY THYROID STIM HORMONE: CPT

## 2025-06-24 PROCEDURE — 82947 ASSAY GLUCOSE BLOOD QUANT: CPT

## 2025-06-24 PROCEDURE — 93005 ELECTROCARDIOGRAM TRACING: CPT

## 2025-06-24 PROCEDURE — P9040: CPT

## 2025-06-24 PROCEDURE — 93017 CV STRESS TEST TRACING ONLY: CPT

## 2025-06-24 PROCEDURE — 86304 IMMUNOASSAY TUMOR CA 125: CPT

## 2025-06-24 PROCEDURE — 36415 COLL VENOUS BLD VENIPUNCTURE: CPT

## 2025-06-24 PROCEDURE — 85027 COMPLETE CBC AUTOMATED: CPT

## 2025-06-24 PROCEDURE — 82010 KETONE BODYS QUAN: CPT

## 2025-06-24 PROCEDURE — 82570 ASSAY OF URINE CREATININE: CPT

## 2025-06-24 PROCEDURE — 82803 BLOOD GASES ANY COMBINATION: CPT

## 2025-06-24 PROCEDURE — 84466 ASSAY OF TRANSFERRIN: CPT

## 2025-06-24 PROCEDURE — 83036 HEMOGLOBIN GLYCOSYLATED A1C: CPT

## 2025-06-24 PROCEDURE — 83540 ASSAY OF IRON: CPT

## 2025-06-24 PROCEDURE — 82550 ASSAY OF CK (CPK): CPT

## 2025-06-24 PROCEDURE — 83615 LACTATE (LD) (LDH) ENZYME: CPT

## 2025-06-24 PROCEDURE — 76830 TRANSVAGINAL US NON-OB: CPT

## 2025-06-24 PROCEDURE — 81001 URINALYSIS AUTO W/SCOPE: CPT

## 2025-06-24 PROCEDURE — 71260 CT THORAX DX C+: CPT

## 2025-06-24 PROCEDURE — 76856 US EXAM PELVIC COMPLETE: CPT

## 2025-06-24 PROCEDURE — 83550 IRON BINDING TEST: CPT

## 2025-06-24 PROCEDURE — 84300 ASSAY OF URINE SODIUM: CPT

## 2025-06-24 PROCEDURE — 82962 GLUCOSE BLOOD TEST: CPT

## 2025-06-24 PROCEDURE — 84100 ASSAY OF PHOSPHORUS: CPT

## 2025-06-24 PROCEDURE — 84133 ASSAY OF URINE POTASSIUM: CPT

## 2025-06-24 PROCEDURE — 80061 LIPID PANEL: CPT

## 2025-06-24 PROCEDURE — 99285 EMERGENCY DEPT VISIT HI MDM: CPT | Mod: 25

## 2025-06-24 PROCEDURE — 87507 IADNA-DNA/RNA PROBE TQ 12-25: CPT

## 2025-06-24 PROCEDURE — 84540 ASSAY OF URINE/UREA-N: CPT

## 2025-06-24 PROCEDURE — 80053 COMPREHEN METABOLIC PANEL: CPT

## 2025-06-24 PROCEDURE — 83935 ASSAY OF URINE OSMOLALITY: CPT

## 2025-06-24 PROCEDURE — 74174 CTA ABD&PLVS W/CONTRAST: CPT

## 2025-06-24 PROCEDURE — 96375 TX/PRO/DX INJ NEW DRUG ADDON: CPT

## 2025-06-24 PROCEDURE — 81003 URINALYSIS AUTO W/O SCOPE: CPT

## 2025-06-24 PROCEDURE — 83880 ASSAY OF NATRIURETIC PEPTIDE: CPT

## 2025-06-24 PROCEDURE — 84132 ASSAY OF SERUM POTASSIUM: CPT

## 2025-06-24 PROCEDURE — 85025 COMPLETE CBC W/AUTO DIFF WBC: CPT

## 2025-06-24 PROCEDURE — 80048 BASIC METABOLIC PNL TOTAL CA: CPT

## 2025-06-24 PROCEDURE — 84484 ASSAY OF TROPONIN QUANT: CPT

## 2025-06-24 PROCEDURE — 83605 ASSAY OF LACTIC ACID: CPT

## 2025-06-24 PROCEDURE — 84156 ASSAY OF PROTEIN URINE: CPT

## 2025-06-24 PROCEDURE — 86900 BLOOD TYPING SEROLOGIC ABO: CPT

## 2025-06-24 PROCEDURE — 84295 ASSAY OF SERUM SODIUM: CPT

## 2025-06-24 PROCEDURE — 82009 KETONE BODYS QUAL: CPT

## 2025-06-24 PROCEDURE — 83010 ASSAY OF HAPTOGLOBIN QUANT: CPT

## 2025-06-24 PROCEDURE — A9502: CPT

## 2025-06-24 PROCEDURE — 86850 RBC ANTIBODY SCREEN: CPT

## 2025-06-24 PROCEDURE — 83690 ASSAY OF LIPASE: CPT

## 2025-06-24 PROCEDURE — 86901 BLOOD TYPING SEROLOGIC RH(D): CPT

## 2025-06-24 PROCEDURE — 82553 CREATINE MB FRACTION: CPT

## 2025-06-24 PROCEDURE — 36430 TRANSFUSION BLD/BLD COMPNT: CPT

## 2025-06-24 PROCEDURE — 93306 TTE W/DOPPLER COMPLETE: CPT

## 2025-06-24 PROCEDURE — 87040 BLOOD CULTURE FOR BACTERIA: CPT

## 2025-06-24 PROCEDURE — 82105 ALPHA-FETOPROTEIN SERUM: CPT

## 2025-06-24 PROCEDURE — 82728 ASSAY OF FERRITIN: CPT

## 2025-06-24 PROCEDURE — 83735 ASSAY OF MAGNESIUM: CPT

## 2025-06-24 PROCEDURE — 82378 CARCINOEMBRYONIC ANTIGEN: CPT

## 2025-06-24 PROCEDURE — 82330 ASSAY OF CALCIUM: CPT

## 2025-06-24 PROCEDURE — 86923 COMPATIBILITY TEST ELECTRIC: CPT

## 2025-06-24 PROCEDURE — 96374 THER/PROPH/DIAG INJ IV PUSH: CPT

## 2025-06-24 PROCEDURE — 78452 HT MUSCLE IMAGE SPECT MULT: CPT

## 2025-06-24 RX ORDER — ASPIRIN 325 MG
1 TABLET ORAL
Qty: 0 | Refills: 0 | DISCHARGE
Start: 2025-06-24

## 2025-06-24 RX ORDER — CEFPODOXIME PROXETIL 200 MG/1
1 TABLET, FILM COATED ORAL
Qty: 10 | Refills: 0
Start: 2025-06-24 | End: 2025-06-28

## 2025-06-24 RX ADMIN — LIDOCAINE HYDROCHLORIDE 1 PATCH: 20 JELLY TOPICAL at 04:40

## 2025-06-24 RX ADMIN — Medication 81 MILLIGRAM(S): at 12:20

## 2025-06-24 RX ADMIN — LIDOCAINE HYDROCHLORIDE 1 PATCH: 20 JELLY TOPICAL at 12:20

## 2025-06-24 RX ADMIN — Medication 1 PACKET(S): at 13:11

## 2025-06-24 RX ADMIN — INSULIN LISPRO 3: 100 INJECTION, SOLUTION INTRAVENOUS; SUBCUTANEOUS at 12:19

## 2025-06-24 RX ADMIN — Medication 1 PACKET(S): at 05:31

## 2025-06-24 RX ADMIN — AMLODIPINE BESYLATE 5 MILLIGRAM(S): 10 TABLET ORAL at 05:31

## 2025-06-24 RX ADMIN — LOSARTAN POTASSIUM 100 MILLIGRAM(S): 100 TABLET, FILM COATED ORAL at 05:31

## 2025-06-24 RX ADMIN — CEFTRIAXONE 100 MILLIGRAM(S): 500 INJECTION, POWDER, FOR SOLUTION INTRAMUSCULAR; INTRAVENOUS at 05:31

## 2025-06-24 RX ADMIN — Medication 25 MICROGRAM(S): at 05:31

## 2025-06-24 NOTE — PROGRESS NOTE ADULT - PROBLEM SELECTOR PROBLEM 2
Anemia requiring transfusions

## 2025-06-24 NOTE — PROGRESS NOTE ADULT - PROBLEM SELECTOR PLAN 8
c/w home synthroid 25

## 2025-06-24 NOTE — PROGRESS NOTE ADULT - PROBLEM SELECTOR PLAN 10
Possible d/c today pending transvag US and OB/GYN f/u, likely outpt f/u  Fully ambulatory, no home care needs

## 2025-06-24 NOTE — PROGRESS NOTE ADULT - PROBLEM SELECTOR PLAN 7
takes gemfibrozil at home

## 2025-06-24 NOTE — PROGRESS NOTE ADULT - ASSESSMENT
85 F from home, [facility] NIDDM, HTN, HLD, hypothyroidism, past surgical history of appendectomy and hysterectomy, presented with intermittent  diarrhea and vomiting, weight loss of 15 lbs in about 5 weeks. Abdominal CT showed new ovarian mass. Cardio work-up negative with echo and nuclear stress test.  Had transvaginal US pending results. 
85F from home, [facility] NIDDM, HTN, HLD, hypothyroidism, past surgical history of appendectomy and hysterectomy, presenting with 3 weeks of nonbloody diarrhea and vomiting all night prior to admission. She reports okay PO intake until two days ago and has lost 15 lbs in about 5 weeks. On exam she appears well but complains of severe lower abdominal pain and bilateral flank pain. Imaging suggests bilateral pyelonephritis and new R adnexal mass. She will be admitted for anemia requiring transfusion, antibiotic management of pyelo, and gynecologic evaluation.      
85 F from home, [facility] NIDDM, HTN, HLD, hypothyroidism, past surgical history of appendectomy and hysterectomy, presented with intermittent  diarrhea and vomiting, weight loss of 15 lbs in about 5 weeks.     ED Course:   Hg 6.6   CTAP: Moderate perivesicular stranding, compatible with cystitis. Mild bilateral pyelonephritis. Prominence of the right adnexa which may represent an ovarian mass.   Admitted   Started on Ceftriaxone, received unit of PRBC, GYN consulted for further recommendations   Hemoglobin now stable, no signs of bleeding   Also noted with up-trended Trop and abnormal EKG with Afib w/ RVR, 1st degree AV block. Transferred to telemetry   Pt underwent nuclear stress test   TTE performed   Pending US transvaginal     
85F from home, [facility] NIDDM, HTN, HLD, hypothyroidism, past surgical history of appendectomy and hysterectomy, presenting with 3 weeks of nonbloody diarrhea and vomiting all night prior to admission. She reports okay PO intake until two days ago and has lost 15 lbs in about 5 weeks. On exam she appears well but complains of severe lower abdominal pain and bilateral flank pain. Imaging suggests bilateral pyelonephritis and new R adnexal mass. She will be admitted for anemia requiring transfusion, antibiotic management of pyelo, and gynecologic evaluation.      
85 F from home, [facility] NIDDM, HTN, HLD, hypothyroidism, past surgical history of appendectomy and hysterectomy, presented with intermittent  diarrhea and vomiting, weight loss of 15 lbs in about 5 weeks.     ED Course:   Hg 6.6   CTAP: Moderate perivesicular stranding, compatible with cystitis. Mild bilateral pyelonephritis. Prominence of the right adnexa which may represent an ovarian mass.   Admitted   Started on Ceftriaxone, received unit of PRBC, GYN consulted for further recommendations   Hemoglobin now stable, no signs of bleeding   Also noted with up-trended Trop and abnormal EKG with Afib w/ RVR, 1st degree AV block. Transferred to telemetry   Pt underwent nuclear stress test   TTE performed   Pending US transvaginal

## 2025-06-24 NOTE — PROGRESS NOTE ADULT - SUBJECTIVE AND OBJECTIVE BOX
No acute events overnight.    S: Feels well, asking when she will be discharged. Has some mild RLQ abd pain on palpation and when she moved her bowels this morning. States it was formed/brown, no black tarry/blood in stool. No dysuria.  No chest pain/discomfort, no other pain.    Allergies    No Known Allergies    Intolerances      Vital Signs Last 24 Hrs  T(C): 36.4 (24 Jun 2025 04:41), Max: 36.8 (23 Jun 2025 11:01)  T(F): 97.5 (24 Jun 2025 04:41), Max: 98.2 (23 Jun 2025 11:01)  HR: 81 (24 Jun 2025 04:41) (79 - 84)  BP: 132/79 (24 Jun 2025 04:41) (119/46 - 153/75)  BP(mean): 72 (24 Jun 2025 04:41) (62 - 72)  RR: 19 (24 Jun 2025 04:41) (18 - 19)  SpO2: 96% (24 Jun 2025 04:41) (94% - 97%)    Parameters below as of 24 Jun 2025 04:41  Patient On (Oxygen Delivery Method): room air        MedsMEDICATIONS  (STANDING):  amLODIPine   Tablet 5 milliGRAM(s) Oral daily  aspirin enteric coated 81 milliGRAM(s) Oral daily  cefTRIAXone   IVPB 2000 milliGRAM(s) IV Intermittent every 24 hours  insulin lispro (ADMELOG) corrective regimen sliding scale   SubCutaneous three times a day before meals  insulin lispro (ADMELOG) corrective regimen sliding scale   SubCutaneous at bedtime  levothyroxine 25 MICROGram(s) Oral daily  lidocaine   4% Patch 1 Patch Transdermal daily  losartan 100 milliGRAM(s) Oral daily  naloxone Injectable 0.4 milliGRAM(s) IV Push once  polyethylene glycol 3350 17 Gram(s) Oral daily  potassium phosphate / sodium phosphate Powder (PHOS-NaK) 1 Packet(s) Oral three times a day  senna 2 Tablet(s) Oral at bedtime    MEDICATIONS  (PRN):  aluminum hydroxide/magnesium hydroxide/simethicone Suspension 30 milliLiter(s) Oral every 4 hours PRN Dyspepsia  bisacodyl 5 milliGRAM(s) Oral daily PRN Constipation  melatonin 3 milliGRAM(s) Oral at bedtime PRN Insomnia  ondansetron Injectable 4 milliGRAM(s) IV Push every 8 hours PRN Nausea and/or Vomiting  oxyCODONE    IR 2.5 milliGRAM(s) Oral every 4 hours PRN Moderate Pain (4 - 6)  oxyCODONE    IR 5 milliGRAM(s) Oral every 4 hours PRN Severe Pain (7 - 10)      PHYSICAL EXAM:  GENERAL: NAD, well-groomed, well-developed  NEURO: AAOx3, LILIAN b/l, 5/5 b/l UE and 5/5 b/l LE motor strength  LUNG: Lungs clear to auscultation bilaterally, no wheezing, rhonchi, or rales.  HEART: S1, S2, no S3 or S4. Regular rate and rhythm. No murmurs, gallops, or rubs. No JVD  ABDOMEN: Bowel sounds present, abd Soft, Nondistended, mildly tender below umbilicus slightly to the right, no rebound tenderness  EXTREMITIES:  2+ Peripheral Pulses b/l, No clubbing, cyanosis, or edema  SKIN: No rashes or lesions    Consultant(s) Notes Reviewed:  [x ] YES  [ ] NO  Care Discussed with Consultants/Other Providers [ x] YES  [ ] NO    LABS:                        10.8   9.55  )-----------( 449      ( 24 Jun 2025 07:16 )             31.1     06-24    134[L]  |  104  |  9   ----------------------------<  128[H]  3.7   |  25  |  0.61    Ca    7.6[L]      24 Jun 2025 07:16  Phos  2.5     06-24  Mg     2.1     06-23    TPro  5.8[L]  /  Alb  2.4[L]  /  TBili  0.2  /  DBili  x   /  AST  16  /  ALT  13  /  AlkPhos  48  06-23        RADIOLOGY & ADDITIONAL TESTS:    EKG:

## 2025-06-24 NOTE — PROGRESS NOTE ADULT - REASON FOR ADMISSION
nausea/vomiting with new ovarian mass

## 2025-06-24 NOTE — DISCHARGE NOTE NURSING/CASE MANAGEMENT/SOCIAL WORK - FINANCIAL ASSISTANCE
[facility] provides services at a reduced cost to those who are determined to be eligible through [facility]’s financial assistance program. Information regarding [facility]’s financial assistance program can be found by going to [email or web address] or by calling [phone].

## 2025-06-24 NOTE — PROGRESS NOTE ADULT - PROBLEM SELECTOR PLAN 4
I do not see evidence of Afib from ekg's or telemetry  Cardiology following.   TTE with normal LVEF, normal LA size, mod MR, poss mod pHTN  Given her presentation of anemia, will not anticoagulate  Outpatient cards f/u for MR  Nuclear stress neg for ischemia, no further work-up recommended

## 2025-06-24 NOTE — PROGRESS NOTE ADULT - PROBLEM SELECTOR PLAN 2
hb 6.6 on admission > 1uprbc  tachycardic to 100 >1L IVF  likely ISO malnutrition for several weeks + possible malignancy     f/u tibc, ferritin, transferrin (obtained prior to blood transfusion)   75cc/hr maintenance fluids   maintain active T&S  trend cbc, transfuse prn    imaging as above.
hb 6.6 on admission > 1uprbc, sp venofer infusion   likely ISO malnutrition for several weeks + possible malignancy   hemoglobin now stable, no signs of bleeding  hem/onc Dr Sifuentes consulted
hb 6.6 on admission > 1uprbc, sp venofer infusion   likely ISO malnutrition for several weeks + possible malignancy   hemoglobin now stable, no signs of bleeding  hem/onc Dr Sifuentes consulted
hb 6.6 on admission > 1uprbc  tachycardic to 100 >1L IVF  likely ISO malnutrition for several weeks + possible malignancy     f/u tibc, ferritin, transferrin (obtained prior to blood transfusion)   75cc/hr maintenance fluids   maintain active T&S  trend cbc, transfuse prn    imaging as above.
hb 6.6 on admission > 1uprbc, sp venofer infusion   likely ISO malnutrition for several weeks + possible malignancy   hemoglobin now stable, no signs of bleeding  hem/onc Dr Sifuentes consulted

## 2025-06-24 NOTE — DISCHARGE NOTE NURSING/CASE MANAGEMENT/SOCIAL WORK - PATIENT PORTAL LINK FT
You can access the FollowMyHealth Patient Portal offered by [facility] by registering at the following website: [email or web address]. By joining [facility]’s FollowMyHealth portal, you will also be able to view your health information using other applications (apps) compatible with our system.

## 2025-06-24 NOTE — PROGRESS NOTE ADULT - PROBLEM SELECTOR PLAN 1
UA negative   CTAP: Moderate perivesicular stranding, compatible with cystitis. Mild bilateral pyelonephritis  continue ceftriaxone
bilateral flank pain though UA negative   nonseptic on admission   s/p ceftriaxone, 1L IVF in ER     c/w ceftriaxone pyelo dosing   75cc/hr maintenance     CTAP: Moderate perivesicular stranding, compatible with cystitis. Mild bilateral pyelonephritis.
UA negative   CTAP: Moderate perivesicular stranding, compatible with cystitis. Mild bilateral pyelonephritis  continue ceftriaxone
UA negative, no urine culture  CTAP: Moderate perivesicular stranding, compatible with cystitis. Mild bilateral pyelonephritis  continue empiric treatment with ceftriaxone  Can transition to oral antibiotics on discharge with cefpoxidime or ceftin
bilateral flank pain though UA negative   nonseptic on admission   s/p ceftriaxone, 1L IVF in ER     c/w ceftriaxone pyelo dosing   75cc/hr maintenance     CTAP: Moderate perivesicular stranding, compatible with cystitis. Mild bilateral pyelonephritis.

## 2025-06-24 NOTE — CHART NOTE - NSCHARTNOTEFT_GEN_A_CORE
Pelvic ultrasound resulted   < from: US Pelvis Complete (06.23.25 @ 13:38) >    INTERPRETATION:  CLINICAL INFORMATION: CT scan demonstrated right adnexal   lesion    LMP: Postmenopausal    COMPARISON: CT scan 6/20/2025    TECHNIQUE:  Endovaginal and transabdominal pelvic sonogram. Limited by bowel gas    FINDINGS: Hysterectomy    Right ovary: 3.2 x 1.9 x 2.2. Within normal limits. Normal arterial and   venous waveforms.  Left ovary: Not identified. No left adnexal mass    Fluid: Trace.    The fluid-filled bladder is unremarkable.    IMPRESSION:  Status post hysterectomy. Unremarkable appearance of right ovary. Left   ovary is not identified.      No acute gyn pathology, no intervention needed at this point   GYN will sign off for now     Dr. Cortez aware

## 2025-06-24 NOTE — PROGRESS NOTE ADULT - PROBLEM SELECTOR PLAN 5
takes janumet at home   HgA1c 6.5   ISS
takes janumet at home   f/u A1c  ISS
takes janumet at home   f/u A1c  ISS
takes janumet at home   HgA1c 6.5   ISS
takes janumet at home   HgA1c 6.5   ISS

## 2025-06-26 LAB
CULTURE RESULTS: SIGNIFICANT CHANGE UP
CULTURE RESULTS: SIGNIFICANT CHANGE UP
SPECIMEN SOURCE: SIGNIFICANT CHANGE UP
SPECIMEN SOURCE: SIGNIFICANT CHANGE UP